# Patient Record
Sex: MALE | Race: WHITE | Employment: FULL TIME | URBAN - METROPOLITAN AREA
[De-identification: names, ages, dates, MRNs, and addresses within clinical notes are randomized per-mention and may not be internally consistent; named-entity substitution may affect disease eponyms.]

---

## 2022-05-18 ENCOUNTER — OFFICE VISIT (OUTPATIENT)
Dept: FAMILY MEDICINE CLINIC | Age: 47
End: 2022-05-18
Payer: COMMERCIAL

## 2022-05-18 ENCOUNTER — HOSPITAL ENCOUNTER (OUTPATIENT)
Age: 47
Setting detail: SPECIMEN
Discharge: HOME | End: 2022-05-18

## 2022-05-18 VITALS
TEMPERATURE: 98.4 F | HEIGHT: 74 IN | BODY MASS INDEX: 39.78 KG/M2 | RESPIRATION RATE: 16 BRPM | DIASTOLIC BLOOD PRESSURE: 88 MMHG | OXYGEN SATURATION: 98 % | SYSTOLIC BLOOD PRESSURE: 136 MMHG | WEIGHT: 310 LBS | HEART RATE: 79 BPM

## 2022-05-18 DIAGNOSIS — Z82.79 FAMILY HISTORY OF PATENT FORAMEN OVALE: ICD-10-CM

## 2022-05-18 DIAGNOSIS — Z82.49 FAMILY HISTORY OF HYPERTROPHIC CARDIOMYOPATHY: ICD-10-CM

## 2022-05-18 DIAGNOSIS — R63.5 WEIGHT GAIN: ICD-10-CM

## 2022-05-18 DIAGNOSIS — Z00.00 PREVENTATIVE HEALTH CARE: Primary | ICD-10-CM

## 2022-05-18 DIAGNOSIS — Z00.00 PREVENTATIVE HEALTH CARE: ICD-10-CM

## 2022-05-18 PROBLEM — S83.241A ACUTE MEDIAL MENISCUS TEAR OF RIGHT KNEE: Status: ACTIVE | Noted: 2020-03-04

## 2022-05-18 LAB
ALBUMIN SERPL-MCNC: 4.2 G/DL (ref 3.5–5.2)
ALBUMIN/GLOBULIN RATIO: 1.8 (ref 1–2.5)
ALP BLD-CCNC: 76 U/L (ref 40–129)
ALT SERPL-CCNC: 17 U/L (ref 5–41)
ANION GAP SERPL CALCULATED.3IONS-SCNC: 11 MMOL/L (ref 9–17)
AST SERPL-CCNC: 18 U/L
BILIRUB SERPL-MCNC: 0.61 MG/DL (ref 0.3–1.2)
BUN BLDV-MCNC: 12 MG/DL (ref 6–20)
CALCIUM SERPL-MCNC: 9.2 MG/DL (ref 8.6–10.4)
CHLORIDE BLD-SCNC: 101 MMOL/L (ref 98–107)
CHOLESTEROL/HDL RATIO: 3.9
CHOLESTEROL: 187 MG/DL
CO2: 25 MMOL/L (ref 20–31)
CREAT SERPL-MCNC: 0.87 MG/DL (ref 0.7–1.2)
GFR AFRICAN AMERICAN: >60 ML/MIN
GFR NON-AFRICAN AMERICAN: >60 ML/MIN
GFR SERPL CREATININE-BSD FRML MDRD: NORMAL ML/MIN/{1.73_M2}
GLUCOSE BLD-MCNC: 86 MG/DL (ref 70–99)
HCT VFR BLD CALC: 42.1 % (ref 40.7–50.3)
HDLC SERPL-MCNC: 48 MG/DL
HEMOGLOBIN: 14.2 G/DL (ref 13–17)
LDL CHOLESTEROL: 124 MG/DL (ref 0–130)
MCH RBC QN AUTO: 32 PG (ref 25.2–33.5)
MCHC RBC AUTO-ENTMCNC: 33.7 G/DL (ref 28.4–34.8)
MCV RBC AUTO: 94.8 FL (ref 82.6–102.9)
NRBC AUTOMATED: 0 PER 100 WBC
PDW BLD-RTO: 12.4 % (ref 11.8–14.4)
PLATELET # BLD: 213 K/UL (ref 138–453)
PMV BLD AUTO: 11.5 FL (ref 8.1–13.5)
POTASSIUM SERPL-SCNC: 4.5 MMOL/L (ref 3.7–5.3)
PROSTATE SPECIFIC ANTIGEN: 0.26 NG/ML
RBC # BLD: 4.44 M/UL (ref 4.21–5.77)
SODIUM BLD-SCNC: 137 MMOL/L (ref 135–144)
TOTAL PROTEIN: 6.6 G/DL (ref 6.4–8.3)
TRIGL SERPL-MCNC: 77 MG/DL
WBC # BLD: 6.1 K/UL (ref 3.5–11.3)

## 2022-05-18 PROCEDURE — 99386 PREV VISIT NEW AGE 40-64: CPT | Performed by: NURSE PRACTITIONER

## 2022-05-18 RX ORDER — FEXOFENADINE HCL 180 MG/1
1 TABLET ORAL 2 TIMES DAILY
COMMUNITY
Start: 2014-01-16

## 2022-05-18 SDOH — ECONOMIC STABILITY: FOOD INSECURITY: WITHIN THE PAST 12 MONTHS, THE FOOD YOU BOUGHT JUST DIDN'T LAST AND YOU DIDN'T HAVE MONEY TO GET MORE.: NEVER TRUE

## 2022-05-18 SDOH — ECONOMIC STABILITY: FOOD INSECURITY: WITHIN THE PAST 12 MONTHS, YOU WORRIED THAT YOUR FOOD WOULD RUN OUT BEFORE YOU GOT MONEY TO BUY MORE.: NEVER TRUE

## 2022-05-18 ASSESSMENT — ENCOUNTER SYMPTOMS
COLOR CHANGE: 0
CONSTIPATION: 0
RHINORRHEA: 0
DIARRHEA: 0
SORE THROAT: 0
ABDOMINAL PAIN: 0
ABDOMINAL DISTENTION: 0
CHEST TIGHTNESS: 0
NAUSEA: 0
BACK PAIN: 0
COUGH: 0
SHORTNESS OF BREATH: 0

## 2022-05-18 ASSESSMENT — PATIENT HEALTH QUESTIONNAIRE - PHQ9
SUM OF ALL RESPONSES TO PHQ QUESTIONS 1-9: 0
SUM OF ALL RESPONSES TO PHQ QUESTIONS 1-9: 0
2. FEELING DOWN, DEPRESSED OR HOPELESS: 0
SUM OF ALL RESPONSES TO PHQ9 QUESTIONS 1 & 2: 0
SUM OF ALL RESPONSES TO PHQ QUESTIONS 1-9: 0
SUM OF ALL RESPONSES TO PHQ QUESTIONS 1-9: 0
1. LITTLE INTEREST OR PLEASURE IN DOING THINGS: 0

## 2022-05-18 ASSESSMENT — SOCIAL DETERMINANTS OF HEALTH (SDOH): HOW HARD IS IT FOR YOU TO PAY FOR THE VERY BASICS LIKE FOOD, HOUSING, MEDICAL CARE, AND HEATING?: NOT HARD AT ALL

## 2022-05-18 NOTE — PATIENT INSTRUCTIONS
Health Maintenance Recommendations  Exercise   · I generally recommend that people of all ages try to get 150 minutes of physical activity per week and it doesnt matter how this totals up, in other words 30 minutes 5 days per week is as good as 50 minutes 3 days a week and so on. · The level of activity should be such that it is able to get your heart rate up to 100 or more, for example a brisk walk should achieve this rate. Dietary Recommendations  · In terms of diet, I generally recommend trying to eat a healthy well balanced diet full of fruits and vegetables. Avoid carbonated drinks and fruit juices and limit your alcohol use. · Avoid processed foods wherever possible (anything that comes in a can or a box) which can be achieved by sticking to the outside walls of the grocery store where generally you will find fresh fruits/vegetables, meats, dairy, and frozen foods. · Try to avoid starches in the diet where possible and minimize bread, rice, potatoes, and pasta in the diet. Specifically try to avoid gluten, which even in people that dont have a yi allergy, causes havoc in the small intestine and alters absorption of nutrients which can in turn lead to obesity. Sleep  · Try to achieve a regular sleep schedule, waking and laying down at the same time each night. Most people need 7 hours per night plus or minus 2 hours. · You will know that youre getting enough because you will wake feeling refreshed and not need to sleep in to catch up on weekends. Skin Care  · Make sure that you dont neglect your skin. · Play it safe in the sun. Use a sunblock on all of your exposed skin. · The sunblock should be broad spectrum and water resistant. · I do recommend an SPF 30 or higher sun screen any time that you plan to be in the sun for more than 20 minutes, even in the winter or on cloudy days (keep in mind that UV light penetrates clouds and can cause burns even on cloudy days).    · Apply 20 to 30 minutes before going out in the sun. Reapply sunblock every 2 hours and after swimming or sweating. Rayma Brandi can also damage your skin on cool, windy days. Clouds and fog do not filter UV light. Make sure you reapply sunblock every 2 hours. · Avoid the sun in the middle of the day, between 10 AM to 4 PM. Your unprotected skin can be damaged in 15 minutes with direct sun exposure. Personal Health  · If you smoke, STOP. There are many resources available to help you successfully quit. · If you are sexually active, always practice safe sex and wear a condom. · See a dentist every 6 months. · See an eye doctor regularly. · Always wear a seat belt while in car. · Get a flu vaccine annually. · Get a tetanus booster vaccine every 10 years. Psychosocial Health  · Finally, make sure that you always have something to look forward to whether this is a vacation, a special event, or just a weekend off work. Having something to look forward to helps to maintain positive focus and is good for mental health.

## 2022-05-18 NOTE — PROGRESS NOTES
Taylor Larson, APRN-[facility]  [address] [address], [address]  [facility] [address]  Dept: [phone]  Dept Fax: [phone]       Patient ID: Nayeli Medina is a 51 y.o. male. HPI    Nayeli Medina is a 51 y.o. male New patient who presents to the office today for a first visit and to establish a relationship with a new primary care provider. Previous PCP: Nisa Giang MD     Today, the patient has no complaints, but was told that he should have gene testing done for cardiac disease, due to mom hx of atril fibrillation, brother cardiomyopathy and HF and dtr was just diagnosis of PFO, dtr cardiologist is the one who highly recommended it. Medial meniscus tear repaired by Dr. Felicitas Mcduffie in 2020    The patient's past medical, surgical, social, and family history as well as his current medications and allergies were reviewed as documented in today's encounter HUSAM Avalos. Current Outpatient Medications on File Prior to Visit   Medication Sig Dispense Refill    Fluticasone Propionate (FLONASE ALLERGY RELIEF NA) by Nasal route       No current facility-administered medications on file prior to visit. Subjective:     Preventative care, male:  Last colonoscopy: 5-6 years ago, [facility] dr. Ricky Posada   Nicotine use: never  Alcohol use: yes, socially  Drug use: never  PSA: 2017   Dental exam: due in June   Eye exam: due     Specialists:  None    Previous office notes, labs, imaging and hospital records were reviewed prior to and during encounter. Review of Systems   Constitutional: Negative for activity change, fatigue and fever. HENT: Negative for congestion, ear pain, rhinorrhea and sore throat. Respiratory: Negative for cough, chest tightness and shortness of breath. Cardiovascular: Negative for chest pain and palpitations. Gastrointestinal: Negative for abdominal distention, abdominal pain, constipation, diarrhea and nausea. Endocrine: Negative for polydipsia, polyphagia and polyuria. Genitourinary: Negative for difficulty urinating and dysuria. Musculoskeletal: Negative for arthralgias, back pain and myalgias. Skin: Negative for color change and rash. Neurological: Negative for dizziness, weakness, light-headedness and headaches. Hematological: Negative for adenopathy. Psychiatric/Behavioral: Negative for agitation and behavioral problems. The patient is not nervous/anxious. Vitals:    05/18/22 0858   BP: 136/88   Pulse: 79   Resp: 16   Temp: 98.4 °F (36.9 °C)   SpO2: 98%     Objective:     Physical Exam  Vitals reviewed. Constitutional:       General: He is not in acute distress. Appearance: Normal appearance. HENT:      Head: Normocephalic and atraumatic. Right Ear: External ear normal.      Left Ear: External ear normal.      Nose: Nose normal.      Mouth/Throat:      Mouth: Mucous membranes are moist.      Pharynx: No oropharyngeal exudate or posterior oropharyngeal erythema. Eyes:      Extraocular Movements: Extraocular movements intact. Conjunctiva/sclera: Conjunctivae normal.      Pupils: Pupils are equal, round, and reactive to light. Cardiovascular:      Rate and Rhythm: Normal rate and regular rhythm. Pulses: Normal pulses. Heart sounds: Normal heart sounds. No murmur heard. Pulmonary:      Effort: Pulmonary effort is normal. No respiratory distress. Breath sounds: Normal breath sounds. No wheezing or rales. Abdominal:      General: Bowel sounds are normal. There is no distension. Palpations: Abdomen is soft. Tenderness: There is no abdominal tenderness. Musculoskeletal:         General: Normal range of motion. Cervical back: Normal range of motion. Right lower leg: No edema. Left lower leg: No edema.    Lymphadenopathy:      Head:      Right side of head: No submental, submandibular, tonsillar, preauricular, posterior auricular or occipital adenopathy. Left side of head: No submental, submandibular, tonsillar, preauricular, posterior auricular or occipital adenopathy. Cervical: No cervical adenopathy. Skin:     General: Skin is warm and dry. Neurological:      General: No focal deficit present. Mental Status: He is alert and oriented to person, place, and time. Deep Tendon Reflexes: Reflexes normal.   Psychiatric:         Mood and Affect: Mood and affect normal.         Behavior: Behavior normal. Behavior is cooperative. Assessment:      Diagnosis Orders   1. Preventative health care  CBC    Comprehensive Metabolic Panel    Hemoglobin A1C    Lipid Panel    PSA Screening   2. Family history of hypertrophic cardiomyopathy  Kristan Marti, [facility] [place], [place]   3. Family history of patent foramen ovale  Kristan Marti, [facility] [place], [place]   4. Weight gain       Plan:     Family history of hypertrophic cardiomyopathy  Family history of patent foramen ovale  - will refer to genetics for further discussion on genetic testing.   - Layne Gaviria, [facility] [place], [place]  - received message that genetics here is unable to consult for this diagnosis  and was given the following information Suggest either  [facility] [phone] or [address] [place] [address] Cardiovascular Genetic Program: [phone]   - pt was called and given the above information by HUSAM Fu. Weight gain  - BP and HR noted on today's visit  - I did d/w pt about starting Adipex and treating for 3 months and will need to be seen monthly for BP and weight check and then will need to take a 6 month hoLiday off the medication.   - I also did stress the importance of exercise to aim for 150 minutes of aerobic activity a week and to watch her diet and calorie intake.   - Discussion had with patient regarding the prescribing of a controlled substance for weight loss (Adipex), the provider is required by law to see the patient  an appointment every thirty days. - This is neccessary to document an accurate weight and blood pressure. - would like pt to try to lose 2 lbs and then can sign contract and start. - Patient verbalized understanding. Preventative health care  - We did discuss in detail the recommended preventative screening guidelines including routine dental visits and eye exam.   - Detailed education was provided on the patient's after visit summary.  - Will order above noted labs and discuss them at follow up visit. - pt verbalized understanding plan of care. Medications, labs, diagnostic studies, consultations and follow-up as documented in this encounter. Rest of systems unchanged, continue current treatments. On this date 5/18/2022 I have spent 30 minutes reviewing previous notes, test results and face to face with the patient discussing the diagnosis and importance of compliance with the treatment plan as well as documenting on the day of the visit.     Eugene Billingsley, AYAKA-CNP

## 2022-05-19 LAB
ESTIMATED AVERAGE GLUCOSE: 97 MG/DL
HBA1C MFR BLD: 5 % (ref 4–6)

## 2023-02-07 ENCOUNTER — OFFICE VISIT (OUTPATIENT)
Dept: FAMILY MEDICINE CLINIC | Age: 48
End: 2023-02-07
Payer: COMMERCIAL

## 2023-02-07 VITALS
HEART RATE: 85 BPM | OXYGEN SATURATION: 98 % | WEIGHT: 315 LBS | BODY MASS INDEX: 40.83 KG/M2 | RESPIRATION RATE: 16 BRPM | SYSTOLIC BLOOD PRESSURE: 136 MMHG | DIASTOLIC BLOOD PRESSURE: 88 MMHG | TEMPERATURE: 97.7 F

## 2023-02-07 DIAGNOSIS — M25.561 ACUTE PAIN OF RIGHT KNEE: Primary | ICD-10-CM

## 2023-02-07 DIAGNOSIS — R63.5 WEIGHT GAIN: ICD-10-CM

## 2023-02-07 PROCEDURE — 99214 OFFICE O/P EST MOD 30 MIN: CPT | Performed by: NURSE PRACTITIONER

## 2023-02-07 SDOH — ECONOMIC STABILITY: INCOME INSECURITY: HOW HARD IS IT FOR YOU TO PAY FOR THE VERY BASICS LIKE FOOD, HOUSING, MEDICAL CARE, AND HEATING?: NOT HARD AT ALL

## 2023-02-07 SDOH — ECONOMIC STABILITY: FOOD INSECURITY: WITHIN THE PAST 12 MONTHS, THE FOOD YOU BOUGHT JUST DIDN'T LAST AND YOU DIDN'T HAVE MONEY TO GET MORE.: NEVER TRUE

## 2023-02-07 SDOH — ECONOMIC STABILITY: HOUSING INSECURITY
IN THE LAST 12 MONTHS, WAS THERE A TIME WHEN YOU DID NOT HAVE A STEADY PLACE TO SLEEP OR SLEPT IN A SHELTER (INCLUDING NOW)?: NO

## 2023-02-07 SDOH — ECONOMIC STABILITY: FOOD INSECURITY: WITHIN THE PAST 12 MONTHS, YOU WORRIED THAT YOUR FOOD WOULD RUN OUT BEFORE YOU GOT MONEY TO BUY MORE.: NEVER TRUE

## 2023-02-07 ASSESSMENT — ENCOUNTER SYMPTOMS
SHORTNESS OF BREATH: 0
COUGH: 0
RHINORRHEA: 0
NAUSEA: 0
CHEST TIGHTNESS: 0
SORE THROAT: 0
BACK PAIN: 0
ABDOMINAL DISTENTION: 0
DIARRHEA: 0
COLOR CHANGE: 0
ABDOMINAL PAIN: 0
CONSTIPATION: 0

## 2023-02-07 ASSESSMENT — PATIENT HEALTH QUESTIONNAIRE - PHQ9
SUM OF ALL RESPONSES TO PHQ QUESTIONS 1-9: 0
2. FEELING DOWN, DEPRESSED OR HOPELESS: 0
1. LITTLE INTEREST OR PLEASURE IN DOING THINGS: 0
SUM OF ALL RESPONSES TO PHQ9 QUESTIONS 1 & 2: 0

## 2023-02-07 NOTE — PROGRESS NOTES
Danise Leyden, APRN-CNP  [facility]  [address] [address]  [address] [address]  Dept: [phone]  Dept Fax: [phone]     Patient ID: Sreekanth Lee is a 49 y.o. male Established patient    HPI    Pt here today for an acute visit secondary to right knee pain, pt states it has gotten worse over the last 2 months. He has always had some pain and had meniscus tear repaired about 4 years ago by [facility] and was told there were degenerative changes. Pt states it hurts when he sits for long periods of time and when he tries to get in and out of the car. - he is also concerned with his weight and would like to know some healthy ways to help with weight loss, he knows his weight is making it harder on his knees. Pt denies any fever or chills. Pt today denies any HA, chest pain, or SOB. Pt denies any N/V/D/C or abdominal pain. Otherwise pt doing well on current tx and no other concerns today. The patient's past medical, surgical, social, and family history as well as his current medications and allergies were reviewed as documented in today's encounter by HUSAM Read. Previous office notes, labs, imaging and hospital records were reviewed prior to and during encounter. Current Outpatient Medications on File Prior to Visit   Medication Sig Dispense Refill    fexofenadine (ALLEGRA) 180 MG tablet Take 1 tablet by mouth 2 times daily      Fluticasone Propionate (FLONASE ALLERGY RELIEF NA) by Nasal route       No current facility-administered medications on file prior to visit. Subjective:     Review of Systems   Constitutional:  Negative for activity change, fatigue and fever. HENT:  Negative for congestion, ear pain, rhinorrhea and sore throat. Respiratory:  Negative for cough, chest tightness and shortness of breath. Cardiovascular:  Negative for chest pain and palpitations.    Gastrointestinal:  Negative for abdominal distention, abdominal pain, constipation, diarrhea and nausea. Endocrine: Negative for polydipsia, polyphagia and polyuria. Genitourinary:  Negative for difficulty urinating and dysuria. Musculoskeletal:  Positive for arthralgias (right knee). Negative for back pain and myalgias. Skin:  Negative for color change and rash. Neurological:  Negative for dizziness, weakness, light-headedness and headaches. Hematological:  Negative for adenopathy. Psychiatric/Behavioral:  Negative for agitation and behavioral problems. The patient is not nervous/anxious. Vitals:    02/07/23 0723   BP: 136/88   Pulse: 85   Resp: 16   Temp: 97.7 °F (36.5 °C)   SpO2: 98%       Objective:     Physical Exam  Vitals reviewed. Constitutional:       Appearance: Normal appearance. HENT:      Head: Normocephalic. Cardiovascular:      Rate and Rhythm: Normal rate and regular rhythm. Musculoskeletal:      Cervical back: Normal range of motion. Right knee: Effusion present. No swelling or erythema. Decreased range of motion. No tenderness. Normal alignment, normal meniscus and normal patellar mobility. Instability Tests: Anterior drawer test negative. Posterior drawer test negative. Anterior Lachman test negative. Medial Avi test negative and lateral Avi test negative. Skin:     General: Skin is warm. Neurological:      Mental Status: He is alert and oriented to person, place, and time. Assessment:      Diagnosis Orders   1. Acute pain of right knee  [facility] [place]      2. Weight gain          Plan:     Acute pain of right knee  - rest, elevate and ice.   - referral for [facility] [place]  - discussed if unable to do PT will refer back to orthopedics. Weight gain  - discussed healthy modifications, increase protein and smaller portion sizes.    - Lifestyle changes: Decrease fats, sugars, carbohydrates, and increase routine exercise, try to get 150 minutes of aerobic activity a week and watch calorie portions and to limit his carbs in his diet. - discussed adipex briefly if he was able to lose at least 2 lbs on his own in 1 month that is an option, pt not interested in medication at this time. - pt verbalized understanding plan of care. Medications, labs, diagnostic studies, consultations and follow-up as documented in this encounter. Rest of systems unchanged, continue current treatments    On this date 2/7/2023 I have spent 30 minutes reviewing previous notes, test results and face to face with the patient discussing the diagnosis and importance of compliance with the treatment plan as well as documenting on the day of the visit.     Crista Harris, AYAKA-CNP

## 2023-02-15 ENCOUNTER — HOSPITAL ENCOUNTER (OUTPATIENT)
Dept: PHYSICAL THERAPY | Facility: CLINIC | Age: 48
Setting detail: THERAPIES SERIES
Discharge: HOME | End: 2023-02-15
Payer: COMMERCIAL

## 2023-02-15 PROCEDURE — 97161 PT EVAL LOW COMPLEX 20 MIN: CPT

## 2023-02-15 PROCEDURE — 97110 THERAPEUTIC EXERCISES: CPT

## 2023-02-15 NOTE — CONSULTS
[facility]  [address] [facility]  Phone: [phone]  Fax: [phone]      Physical Therapy Lower Extremity Evaluation    Date:  2/15/2023  Patient: Rhonda Talavera  : 1975  MRN: [id number]  Physician: AYAKA Tompkins - CNP    Insurance: Krunal Jabier After 25th Visit)  Medical Diagnosis: M25.561 (ICD-10-CM) - Acute pain of right knee    Rehab Codes: M25. 501, M25. 661  Onset date: 23   Next Dr's appt. : --    Subjective:   CC/HPI: Pt reports to PT with R knee pain. Per pt, he reports that he tore his meniscus back in HS, as well as again tearing it around 4 years ago when he did have a repair. Pt states that he was also told that he had arthritis at that time. Currently, pt reports that he can have issues with sitting for longer periods, as well as difficulty with squatting. Pt states that he has some clicking and popping in the knee. Pt denies any numbness/tingling.      PMHx: [x] Refer to full medical chart in [facility]   [] Unremarkable [] Diabetes [] HTN  [] Pacemaker   [] MI/Heart Problems [] Cancer [] Arthritis   [] Other:                  Tests: [] X-Ray:    [] MRI:    [] Other:     Comorbidities:   [] Obesity [] Dialysis  [x] N/A   [] Asthma/COPD [] Dementia [] Other:   [] Stroke [] Sleep apnea [] Other:   [] Vascular disease [] Rheumatic disease [] Other:       Medications:  [x] Refer to full medical record [] None [] Other:  Allergies:       [] Refer to full medical record [x] None [] Other:    ADL/IADL [x] Previously independent with all [x] Currently independent with all Who currently assists the patient with task     [] Previously independent with all except: [] Currently independent with all except:     Bathing  [] Assist [] Assist     Dress/grooming [] Assist [] Assist     Transfer/mobility [] Assist [] Assist     Feeding [] Assist [] Assist     Toileting [] Assist [] Assist     Driving [] Assist [] Assist     Housekeeping [] Assist [] Assist     Grocery shop/meal prep [] Assist [] Assist          Gait Prior level of function Current level of function    [x] Independent  [] Assist [x] Independent  [] Assist   Device: [x] Independent [x] Independent    [] Straight Cane [] Quad cane [] Straight Cane [] Quad cane    [] Standard walker [] Rolling walker   [] 4 wheeled walker [] Standard walker [] Rolling walker   [] 4 wheeled walker    [] Wheelchair [] Wheelchair       Marital Status    Home type 1 SH with basement   Stairs from outside    [employer] inside [address] [place] [address] Rd status Part time   Work Activities/duties  Carrying heavy tools, walking on uneven ground, driving long distance, navigating stairs   Recreational Activities Hunting       Pain present? No   Location --   Pain Rating currently 0/10   Pain at worse 9/10   Pain at best 0/10   Description of pain Intermittent, sharp, aching   Altered Sensation Denies   What makes it worse When my knee is bent for long periods of time   What makes it better Movement   Symptom progression Stayed the same   Sleep Sleep can be affected             Objective:    STRENGTH    Left Right   Hip Flex 4+/5 4+/5   Ext     ABD 3+/5 3+/5   ADD     Knee Flex 5/5 5/5   Ext 5/5 5/5   Ankle DF 5/5 5/5   PF 5/5 5/5   INV     EVER              ROM  ° A/P    Left Right   Hip Flex     Ext     ER     IR     ABD     ADD     Knee Flex 130 116   Ext 0 7* from 0   Ankle DF     PF     INV     EVER            TESTS (+/-) Left Right Not Tested   Ant. Drawer   [x]   Post. Drawer   [x]   Lachmans   [x]   Valgus Stress   [x]   Varus Stress   [x]   Avis   [x]   Apleys Comp.    [x]   Apleys Dist.   [x]   Hip Scouring   [x]   ANAMs   [x]   Piriformis   [x]   Kellees   [x]   Talor Tilt   [x]   Pat-Fem Bailey Fallen   [x]   Thessaly  +        OBSERVATION No Deficit Deficit Not Tested Comments   Posture       Forward Head [] [] []    Rounded Shoulders [] [] []    Genu Valgus [] [] []    Genu Varus [] [] []    Slumped Sitting [] [] []    Palpation [] [x] [] Pt reports tenderness along R distal quad   Sensation [x] [] [] No deficits with testing   Patellar Mobility [] [x] [] Pt with limited R patellar mobility compared to L   Gait [x] [] [] Analysis:         FUNCTION Normal Difficult Unable   Sitting [] [x] []   Standing [] [x] []   Ambulation [x] [] []   Lift/Carry [] [x] []   Stairs [] [x] []   Bending [x] [] []   Squat [] [x] []            Flexibility Normal Left tight Right tight   Hip flexor [] [x] [x]   HS [] [x] [x]   gastroc [] [x] [x]   piriformis [] [x] [x]   Quad [] [x] [x]    [] [] []          Functional Test: LEFS Score: 44% functionally impaired       Assessment:    Patient would benefit from skilled physical therapy services in order to: Improve R LE flexibility, improve R knee ROM, improve manjinder hip/core strength, and decrease pain with sitting and squatting to help improve tolerance to all work activities. Problems:    [x] ? Pain  [x] ? ROM  [x] ? Strength  [x] ? Function        Goals  MET NOT MET ON-  GOING  Details   Date Addressed:       STG: To be met in 8 treatments           1. ? Pain: Decrease pain levels to 5/10 with all sitting to help ease tolerance to driving while at work.  []  []  []      2. ? ROM: Increase flexibility in R LE to help improve R knee ROM to 0/130 to equal R and reduce risk for compensations with all squatting at work. []  []  []      3. Improve score on assessment tool LEFS from 44% impairment to less than 34% impairment, demonstrating improved tolerance to activity to help improve tolerance to all squatting to ease work tasks. []  []  []     4. Independent with Home Exercise Programs []  []  []      []  []  []     Date Addressed:        LTG: To be met in 16 treatments       1.  Improve score on assessment tool LEFS from 44% impairment to less than 24% impairment, demonstrating improved tolerance to activity to help improve tolerance to all squatting to ease work tasks. []  []  []     2. Reduce pain levels to 2/10 or less with all sitting to help ease tolerance to driving while at work.  []  []  []     3. ? Strength: Increase manjinder hip strength to 4+/5 grossly to help improve LE stability with all squatting and walking on uneven ground, reducing risk for further knee injury. []  []  []                Patient goals: \"Decreased pain/stiffness\"    Rehab Potential:  [x] Good  [] Fair  [] Poor   Suggested Professional Referral:  [x] No  [] Yes:  Barriers to Goal Achievement[de-identified]  [x] No  [] Yes:  Domestic Concerns:  [x] No  [] Yes:    Pt. Education:  [x] Plans/Goals, Risks/Benefits discussed  [x] Home exercise program    Method of Education: [x] Verbal  [x] Demo  [x] Written  Access Code: [email or web address]  URL: [employer]/  Date: 02/15/2023  Prepared by: [facility]    Exercises  Standing Hamstring Stretch with Step - 3 x daily - 7 x weekly - 3 sets - 30 seconds hold  Gastroc Stretch on Wall - 3 x daily - 7 x weekly - 3 sets - 30 seconds hold  Prone Quadriceps Stretch with Strap - 3 x daily - 7 x weekly - 3 sets - 30 seconds hold    Comprehension of Education:  [x] Verbalizes understanding. [x] Demonstrates understanding. [x] Needs Review. [] Demonstrates/verbalizes understanding of HEP/Ed previously given.     Treatment Plan:    [x] Therapeutic Exercise   32409  [] Iontophoresis: 4 mg/mL Dexamethasone Sodium Phosphate  mAmin  36964   [x] Manual Therapy  63816 [x] Vasopneumatic cold with compression  27590    [] Gait Training   92024 [] Ultrasound   24873   [] Neuromuscular Re-education  26436 [] Electrical Stimulation Unattended  75861   [x]  Therapeutic Activity  75220 [] Electrical Stimulation Attended  52828   [x] Instruction in HEP  [] Lumbar/Cervical Traction  [id number]   [] Aquatic Therapy   79658 [x] Cold/hotpack    [] Massage   57723      [] Dry Needling, 1 or 2 muscles  15675   [] Biofeedback, first 15 minutes   80094  [] Biofeedback, additional 15 minutes   81886 [] Dry Needling, 3 or more muscles  20561     []  Medication allergies reviewed for use of    Dexamethasone Sodium Phosphate 4mg/ml     with iontophoresis treatments. Pt is not allergic. Frequency:  2 x/week for 16 visits      Todays Treatment:  Precautions: General  Exercise  R Knee Pain Reps/ Time Weight/ Level Comments   Bike            HS step S 3x30\"     SB S      Gastroc wall S 3x30\"     Hip flexr step S      Prone quad S 3x30\"                 Bridges      Clamshells      SL Hip abd                        Other:    Specific Instructions for next treatment: Continue tx per POC    Evaluation Complexity:  History (Personal factors, comorbidities) [] 0 [x] 1-2 [] 3+   Exam (limitations, restrictions) [x] 1-2 [] 3 [] 4+   Clinical presentation (progression) [x] Stable [] Evolving  [] Unstable   Decision Making [x] Low [] Moderate [] High    [x] Low Complexity [] Moderate Complexity [] High Complexity       Treatment Charges: Mins Units   [x] Evaluation       [x]  Low       []  Moderate       []  High 32 1   [x]  Ther Exercise 13 1   []  Manual Therapy     []  Vasocompression     []  Gait     []        TOTAL TREATMENT TIME: 45    Time in: 0800    Time Out: 9007      Electronically signed by: Dania River PT        Physician Signature:________________________________Date:__________________  By signing above or cosigning this note, I have reviewed this plan of care and certify a need for medically necessary rehabilitation services.      *PLEASE SIGN ABOVE AND FAX BACK ALL PAGES*

## 2023-02-16 ENCOUNTER — HOSPITAL ENCOUNTER (OUTPATIENT)
Dept: PHYSICAL THERAPY | Facility: CLINIC | Age: 48
Setting detail: THERAPIES SERIES
Discharge: HOME | End: 2023-02-16
Payer: COMMERCIAL

## 2023-02-16 PROCEDURE — 97110 THERAPEUTIC EXERCISES: CPT

## 2023-02-16 NOTE — FLOWSHEET NOTE
[] [address]  [address]  [phone]  F: [phone] [] [address]  [facility] [address]   [address]  P: [phone]  F: [phone] [x] [place] [facility]  [address]  P: [phone]  F: [phone] [] [address]  P: [phone]  F: [phone] [] [address]  [facility]   [address]   [place]: [phone]  F: [phone]      Physical Therapy Daily Treatment Note    Date:  2023  Patient Name:  Pardeep Lerma    :  1975  MRN: [id number]  Physician: AYAKA Mayo - PRIYANK                                       Insurance: [employer] After 25th Visit)  Medical Diagnosis: M25.561 (ICD-10-CM) - Acute pain of right knee                      Rehab Codes: M25. 154, M25. 661  Onset date: 23                              Next 's appt. : --  Visit# / total visits:     Cancels/No Shows: 0/0    Subjective: pt arrives this afternoon noting that his R knee is a little stiff, did do some exercises this morning. States he did feel better following his previous visit. Pain:  [x] Yes  [] No Location: R knee  Pain Rating: (0-10 scale) 4/10  Pain altered Tx:  [x] No  [] Yes  Action:  Comments:    Objective:       Todays Treatment:  Precautions: General  Exercise  R Knee Pain Reps/ Time Weight/ Level Comments   Bike  7'                 HS step S 3x30\"       SB S 3x30\"       Hip flexr step S 3x30\"       Prone quad S 3x30\"                           Bridges  20x  lime     Clamshells  20x  lime     SL Hip abd  20x  lime                TGym squats/HR  25x  L15    TKE  30x3\"  lime    OKC 3 way hip  15x   lime    LAQ  20x                 Other:     Specific Instructions for next treatment: Continue tx per POC      Treatment Charges: Mins Units   []  Modalities     [x]  Ther Exercise 43 3   []  Manual Therapy     []  Ther Activities     []  Aquatics     []  Vasocompression     []  Other     Total Treatment time 43 3       Assessment: [x] Progressing toward goals. Completed bike warm up without issues. Reviewed and completed previous stretches along with additional stretches to address RLE mobility without issues. Was able to begin strengthening ex to address hip and R knee strength this date. Denied knee pain with all quad focused ex. While completing TGym squat some clicking of the R knee was noted with increased ROM, advised to stop depth of squat prior to that point, good follow through. No pain with all standing ex. Pt just reports some mild soreness. Will monitor response to treatment and progress as chapin. [] No change. [] Other:  [x] Patient would continue to benefit from skilled physical therapy services in order to: Improve R LE flexibility, improve R knee ROM, improve manjinder hip/core strength, and decrease pain with sitting and squatting to help improve tolerance to all work activities. Goals  MET NOT MET ON-  GOING  Details   Date Addressed:           STG: To be met in 8 treatments            1. ? Pain: Decrease pain levels to 5/10 with all sitting to help ease tolerance to driving while at work.  []  []  []      2. ? ROM: Increase flexibility in R LE to help improve R knee ROM to 0/130 to equal R and reduce risk for compensations with all squatting at work. []  []  []      3. Improve score on assessment tool LEFS from 44% impairment to less than 34% impairment, demonstrating improved tolerance to activity to help improve tolerance to all squatting to ease work tasks. []  []  []      4. Independent with Home Exercise Programs []  []  []        []  []  []      Date Addressed:            LTG: To be met in 16 treatments           1.  Improve score on assessment tool LEFS from 44% impairment to less than 24% impairment, demonstrating improved tolerance to activity to help improve tolerance to all squatting to ease work tasks. []  []  []      2. Reduce pain levels to 2/10 or less with all sitting to help ease tolerance to driving while at work.  []  []  []      3. ? Strength: Increase manjinder hip strength to 4+/5 grossly to help improve LE stability with all squatting and walking on uneven ground, reducing risk for further knee injury. []  []  []                        Patient goals: \"Decreased pain/stiffness\"      Pt. Education:  [x] Yes  [] No  [] Reviewed Prior HEP/Ed  Method of Education: [x] Verbal  [x] Demo  [] Written  Comprehension of Education:  [x] Verbalizes understanding. [x] Demonstrates understanding. [x] Needs review. [] Demonstrates/verbalizes HEP/Ed previously given. Access Code: [email or web address]  URL: [email or web address]  Date: 02/15/2023  Prepared by: [facility]     Exercises  Standing Hamstring Stretch with Step - 3 x daily - 7 x weekly - 3 sets - 30 seconds hold  Gastroc Stretch on Wall - 3 x daily - 7 x weekly - 3 sets - 30 seconds hold  Prone Quadriceps Stretch with Strap - 3 x daily - 7 x weekly - 3 sets - 30 seconds hold     Plan: [x] Continue current frequency toward long and short term goals.     [x] Specific Instructions for subsequent treatments: progress as chapin      Time In: 3:00 pm            Time Out: 3:48 pm    Electronically signed by:  Sheila Deleon PTA

## 2023-02-20 ENCOUNTER — HOSPITAL ENCOUNTER (OUTPATIENT)
Dept: PHYSICAL THERAPY | Facility: CLINIC | Age: 48
Setting detail: THERAPIES SERIES
Discharge: HOME | End: 2023-02-20
Payer: COMMERCIAL

## 2023-02-20 PROCEDURE — 97110 THERAPEUTIC EXERCISES: CPT
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
## 2023-02-22 ENCOUNTER — HOSPITAL ENCOUNTER (OUTPATIENT)
Dept: PHYSICAL THERAPY | Facility: CLINIC | Age: 48
Setting detail: THERAPIES SERIES
Discharge: HOME | End: 2023-02-22
Payer: COMMERCIAL

## 2023-02-22 PROCEDURE — 97110 THERAPEUTIC EXERCISES: CPT

## 2023-02-22 NOTE — FLOWSHEET NOTE
[] [address]  [address]  [phone]  F: [phone] [] [address]  [facility] [address]   [address]  P: [phone]  F: [phone] [x] [place] [facility]  [address]  P: [phone]  F: [phone] [] [address]  P: [phone]  F: [phone] [] [address]  [facility]   [address]   [place]: [phone]  F: [phone]      Physical Therapy Daily Treatment Note    Date:  2023  Patient Name:  Valentine Power    :  1975  MRN: [id number]  Physician: AYAKA Obrien - PRIYANK                                       Insurance: [employer] After 25th Visit)  Medical Diagnosis: M25.561 (ICD-10-CM) - Acute pain of right knee                      Rehab Codes: M25. 591, M25. 661  Onset date: 23                              Next 's appt. : --  Visit# / total visits:     Cancels/No Shows: 0/0    Subjective: pt mentions that his knee is feeling much better this morning as compared to his previous visit. Pain:  [x] Yes  [] No Location: R knee  Pain Rating: (0-10 scale) 2-3/10  Pain altered Tx:  [x] No  [] Yes  Action:  Comments:     Objective:       Todays Treatment:  Precautions: General  Exercise  R Knee Pain Reps/ Time Weight/ Level Comments    Bike  8'     x              HS step S 3x30\"     x   SB S 3x30\"     x   Hip flexr step S 3x30\"     x   Prone quad S 3x30\"     x                         Bridges  20x3\" Lime       x   Clamshells  20x Lime    x   SL Hip abd  20x Lime        SLR  20x Lime                TGym squats/HR  30x  L15  x   TKE  20x10\" blue  x   OKC 3 way hip  30x  Lime   x   LAQ  20x3        Step up  20x  8\"  Slow eccentric down, fwd/lat   x   Other:  Hypervolt - R HS/gastroc/ quad -6'     Specific Instructions for next treatment: Continue tx per POC      Treatment Charges: Mins Units   []  Modalities     [x]  Ther Exercise 42 3   [x]  Manual Therapy 6 nc   []  Ther Activities     []  Aquatics     []  Vasocompression     []  Other     Total Treatment time 48 3       Assessment: [x] Progressing toward goals. Warm up and stretches completed as previous. Reviewed mat resisted ex to ensure good technique, able to complete without issues. Added resisted SLR for continued strengthening. Increased resistance with TKE, also added step ups forward and lateral. Cueing with step ups to focus on slow and controlled eccentric movements. Able to finish all strengthening ex without reports of R knee pain. End treatment with use of hypervolt to address any R LE tension into front and back mm. Will continue to progress as chapin. [] No change. [] Other:  [x] Patient would continue to benefit from skilled physical therapy services in order to: Improve R LE flexibility, improve R knee ROM, improve manjinder hip/core strength, and decrease pain with sitting and squatting to help improve tolerance to all work activities. Goals  MET NOT MET ON-  GOING  Details   Date Addressed:           STG: To be met in 8 treatments            1. ? Pain: Decrease pain levels to 5/10 with all sitting to help ease tolerance to driving while at work.  []  []  []      2. ? ROM: Increase flexibility in R LE to help improve R knee ROM to 0/130 to equal R and reduce risk for compensations with all squatting at work. []  []  []      3. Improve score on assessment tool LEFS from 44% impairment to less than 34% impairment, demonstrating improved tolerance to activity to help improve tolerance to all squatting to ease work tasks. []  []  []      4. Independent with Home Exercise Programs []  []  []        []  []  []      Date Addressed:            LTG: To be met in 16 treatments           1.  Improve score on assessment tool LEFS from 44% impairment to less than 24% impairment, demonstrating improved tolerance to activity to help improve tolerance to all squatting to ease work tasks. []  []  []      2. Reduce pain levels to 2/10 or less with all sitting to help ease tolerance to driving while at work.  []  []  []      3. ? Strength: Increase manjinder hip strength to 4+/5 grossly to help improve LE stability with all squatting and walking on uneven ground, reducing risk for further knee injury. []  []  []                        Patient goals: \"Decreased pain/stiffness\"      Pt. Education:  [x] Yes  [] No  [x] Reviewed Prior HEP/Ed  Method of Education: [x] Verbal  [] Demo  [] Written  Comprehension of Education:  [x] Verbalizes understanding. [] Demonstrates understanding. [] Needs review. [] Demonstrates/verbalizes HEP/Ed previously given. Access Code: [email or web address]  URL: [email or web address]  Date: 02/15/2023  Prepared by: [facility]     Exercises  Standing Hamstring Stretch with Step - 3 x daily - 7 x weekly - 3 sets - 30 seconds hold  Gastroc Stretch on Wall - 3 x daily - 7 x weekly - 3 sets - 30 seconds hold  Prone Quadriceps Stretch with Strap - 3 x daily - 7 x weekly - 3 sets - 30 seconds hold     Plan: [x] Continue current frequency toward long and short term goals.     [x] Specific Instructions for subsequent treatments: progress as chapin      Time In: 8:00 am            Time Out:  8:55 am    Electronically signed by:  Danial Carvajal PTA

## 2023-11-03 ENCOUNTER — TELEMEDICINE (OUTPATIENT)
Dept: FAMILY MEDICINE CLINIC | Age: 48
End: 2023-11-03
Payer: COMMERCIAL

## 2023-11-03 ENCOUNTER — TELEPHONE (OUTPATIENT)
Dept: FAMILY MEDICINE CLINIC | Age: 48
End: 2023-11-03

## 2023-11-03 DIAGNOSIS — H10.32 ACUTE BACTERIAL CONJUNCTIVITIS OF LEFT EYE: Primary | ICD-10-CM

## 2023-11-03 PROCEDURE — 99213 OFFICE O/P EST LOW 20 MIN: CPT | Performed by: NURSE PRACTITIONER

## 2023-11-03 RX ORDER — TOBRAMYCIN AND DEXAMETHASONE 3; 1 MG/ML; MG/ML
1 SUSPENSION/ DROPS OPHTHALMIC
Qty: 2.5 ML | Refills: 0 | Status: SHIPPED | OUTPATIENT
Start: 2023-11-03 | End: 2023-11-13

## 2023-11-03 ASSESSMENT — PATIENT HEALTH QUESTIONNAIRE - PHQ9
SUM OF ALL RESPONSES TO PHQ QUESTIONS 1-9: 0
2. FEELING DOWN, DEPRESSED OR HOPELESS: 0
SUM OF ALL RESPONSES TO PHQ9 QUESTIONS 1 & 2: 0
1. LITTLE INTEREST OR PLEASURE IN DOING THINGS: 0
SUM OF ALL RESPONSES TO PHQ QUESTIONS 1-9: 0

## 2023-11-03 ASSESSMENT — ENCOUNTER SYMPTOMS
EYE PAIN: 1
EYE REDNESS: 1
EYE DISCHARGE: 1

## 2023-11-03 NOTE — TELEPHONE ENCOUNTER
Patient has had watery eyes, left eye is red and swollen and now with yellow/green drainage. Scheduled for a VV now.

## 2023-11-03 NOTE — PROGRESS NOTES
Lluvia Palacios, APRN-[facility]  [address] [place] [address]  [address], [address] [address]  Dept: [phone]  Dept Fax: [phone]    Patient ID: Brian Ghosh is a 51 y.o. male. HPI     Subjective:     Brian Ghosh is a 51 y.o. male who presents today via virtual visit complaining of fection to left eye. Pt states it was pink last night and some swelling, but then he woke up with this morning with the left eye closed shut with yellow discharge, swelling and redness. Pt denies any fever or chills. Pt today denies any HA, chest pain, or SOB. Pt denies any N/V/D/C or abdominal pain. Otherwise pt doing well on current tx and no other concerns today. The patient's past medical, surgical, social, and family history as well as his current medications and allergies were reviewed as documented in today's encounter by HUSAM Hidalgo. Current Outpatient Medications on File Prior to Visit   Medication Sig Dispense Refill    fexofenadine (ALLEGRA) 180 MG tablet Take 1 tablet by mouth 2 times daily      Fluticasone Propionate (FLONASE ALLERGY RELIEF NA) by Nasal route       No current facility-administered medications on file prior to visit. Review of Systems   Eyes:  Positive for pain, discharge and redness. Objective:     No Known Allergies, History reviewed. No pertinent past medical history. ,   Past Surgical History:   Procedure Laterality Date    TONSILLECTOMY     ,   Social History     Tobacco Use    Smoking status: Never    Smokeless tobacco: Never   Substance Use Topics    Alcohol use: Yes     Comment: socially    Drug use: Never   ,   Family History   Problem Relation Age of Onset    Atrial Fibrillation Mother     Heart Failure Brother 27        hypertrophic valve   ,   Immunization History   Administered Date(s) Administered    COVID-19, MODERNA BLUE border, Primary or Immunocompromised, (age 12y+), IM, 100 mcg/0.5mL

## 2024-03-04 ENCOUNTER — TELEPHONE (OUTPATIENT)
Dept: FAMILY MEDICINE CLINIC | Age: 49
End: 2024-03-04

## 2024-03-04 DIAGNOSIS — M25.561 ACUTE PAIN OF RIGHT KNEE: Primary | ICD-10-CM

## 2024-03-04 NOTE — TELEPHONE ENCOUNTER
Patient was told at his visit with Dr. Stern today that he will need to find another provider because they won't be with [facility] after the first. I did explain that they will probably come to an agreement before then, but to check with his insurance about who he can see and let us know.

## 2024-03-04 NOTE — TELEPHONE ENCOUNTER
----- Message from Otf Regulo sent at 3/4/2024  1:54 PM EST -----  Subject: Referral Request    Reason for referral request? Patient needs referral to different ortho   doctor than who he has seen before because after this month they are no   longer with [employer]  Provider patient wants to be referred to(if known):     Provider Phone Number(if known):    Additional Information for Provider?   ---------------------------------------------------------------------------  --------------  CALL BACK INFO    [phone]; OK to leave message on voicemail  ---------------------------------------------------------------------------  --------------

## 2024-03-04 NOTE — TELEPHONE ENCOUNTER
Patient called back to office stating that he checked with his insurance to see what orthopedic providers were in his network.  Patient would like referral for Dr. Holger Cabral.

## 2024-03-07 ENCOUNTER — OFFICE VISIT (OUTPATIENT)
Dept: ORTHOPEDIC SURGERY | Age: 49
End: 2024-03-07
Payer: COMMERCIAL

## 2024-03-07 DIAGNOSIS — M25.561 RIGHT KNEE PAIN, UNSPECIFIED CHRONICITY: ICD-10-CM

## 2024-03-07 DIAGNOSIS — M25.562 LEFT KNEE PAIN, UNSPECIFIED CHRONICITY: Primary | ICD-10-CM

## 2024-03-07 PROCEDURE — 99203 OFFICE O/P NEW LOW 30 MIN: CPT | Performed by: ORTHOPAEDIC SURGERY

## 2024-03-07 SDOH — HEALTH STABILITY: PHYSICAL HEALTH: ON AVERAGE, HOW MANY MINUTES DO YOU ENGAGE IN EXERCISE AT THIS LEVEL?: 20 MIN

## 2024-03-07 SDOH — HEALTH STABILITY: PHYSICAL HEALTH: ON AVERAGE, HOW MANY DAYS PER WEEK DO YOU ENGAGE IN MODERATE TO STRENUOUS EXERCISE (LIKE A BRISK WALK)?: 1 DAY

## 2024-03-07 NOTE — PROGRESS NOTES
[facility]                   Holger Cabral M.D.            [address], [address]               [place], [place] [address]           Dept Phone: [phone]           Dept Fax:  [phone] [address] [facility]                       [address]           [place], [place] [address]          Dept Phone: [phone]           Dept Fax:  [phone]      Chief Compliant:  Chief Complaint   Patient presents with    Pain     Rt knee        History of Present Illness:  This is a 48 y.o. male who presents to the clinic today for evaluation / follow up of significant left knee pain.  Patient is 48-year-old gentleman whose had knee pain on and off for the last few months.  He had a previous arthroscopy examination of his right knee with a medial meniscus tear done elsewhere.  He is at the point where he has utilize crutches with the pains got to be something with any twisting or turning he had an episode recently in the shower when he pivoted and felt something pop.  Is been quite painful for him..       Review of Systems   Constitutional: Negative for fever, chills, sweats.   Eyes: Negative for changes in vision, or pain.   HENT: Negative for ear ache, epistaxis, or sore throat.  Respiratory/Cardio: Negative for Chest pain, palpitations, SOB, or cough.  Gastrointestinal: Negative for abdominal pain, N/V/D.   Genitourinary: Negative for dysuria, frequency, urgency, or hematuria.   Neurological: Negative for headache, numbness, or weakness.   Integumentary: Negative for rash, itching, laceration, or abrasion.   Musculoskeletal: Positive for Pain (Rt knee)       Physical Exam:  Constitutional: Patient is oriented to person, place, and time. Patient appears well-developed and well nourished.   HENT: Negative otherwise noted  Head: Normocephalic and Atraumatic  Nose: Normal  Eyes: Conjunctivae and EOM are normal  Neck: Normal range of motion Neck supple.    Respiratory/Cardio:

## 2024-03-14 ENCOUNTER — HOSPITAL ENCOUNTER (OUTPATIENT)
Dept: MRI IMAGING | Facility: CLINIC | Age: 49
Discharge: HOME | End: 2024-03-16
Payer: COMMERCIAL

## 2024-03-14 DIAGNOSIS — M25.562 LEFT KNEE PAIN, UNSPECIFIED CHRONICITY: ICD-10-CM

## 2024-03-14 PROCEDURE — 73721 MRI JNT OF LWR EXTRE W/O DYE: CPT

## 2024-03-15 ENCOUNTER — TELEPHONE (OUTPATIENT)
Dept: ORTHOPEDIC SURGERY | Age: 49
End: 2024-03-15

## 2024-03-15 NOTE — TELEPHONE ENCOUNTER
Holger Cabral MD  P [facility] Clinical Support Pool  Medial meniscus tear of the knee.  Consider arthroscopic intervention if symptoms persist    Called and left VM for patient to call back and discuss MRI results.

## 2024-03-27 ASSESSMENT — ENCOUNTER SYMPTOMS
ABDOMINAL PAIN: 0
SHORTNESS OF BREATH: 0
SINUS PRESSURE: 0
NAUSEA: 0

## 2024-03-27 NOTE — H&P (VIEW-ONLY)
HISTORY and PHYSICAL  [facility]       NAME:  Mikhail Mahan  MRN: [id number]   YOB: 1975   Date: 3/29/2024   Age: 48 y.o.  Gender: male     COMPLAINT AND PRESENT HISTORY:     Mikhail Mahan is 48 y.o., male, presents for pre-anesthesia/admission testing for   Dx: Dislocation of left knee with medial meniscus tear    With scheduled KNEE ARTHROSCOPY PARTIAL MEDIAL MENISCECTOMY LEFT   per Dr. Cabral    Office note per Dr. Cabral  on  3/07/24, italicized below.  History of Present Illness:  This is a 48 y.o. male who presents to the clinic today for evaluation / follow up of significant left knee pain.  Patient is 48-year-old gentleman whose had knee pain on and off for the last few months.  He had a previous arthroscopy examination of his right knee with a medial meniscus tear done elsewhere.  He is at the point where he has utilize crutches with the pains got to be something with any twisting or turning he had an episode recently in the shower when he pivoted and felt something pop.  Is been quite painful for him..   Above reviewed with patient and he concurs    UPDATE:   Patient states that he initially had stiffness for a short time, then having the twisting episode in the shower and popping sounds had severe sharp pain.    Patient denies any prior injury or knee surgery.   Patient is s/p right knee arthroscopy done in 2020.    Patient  continues to c/o pain but states is  considerably less , admits to continued stiffness, clicking and popping in the  left Knee. Patient reports that he has no longer needed to use his crutches.    Pt describes and rates  the pain as  dull ache 1-3/10in intensity on average.    Pt states that more activity increases the pain level more.    Onset of symptoms started 3 months ago,  that has progressively gotten better but, still has  some limitations.     Patient reports that  prolonged standing or sitting aggravates sxs the most.     Patient has been

## 2024-03-27 NOTE — H&P
using  icing , elevation, stretching to help in pain relief.      Pt denies any  recent physical therapy.   No history of corticosteroid injections.     Pt reports that  the knee does  experience a buckling or given away sensation.  Pt denies any  locking up of the knee. Reports that his knee sometimes wants to hyperextend backward at times.   Pt denies any recent falls or trauma.  Patient  has used crutches    as assistive device.      Patient denies any extreme  joint warmth, redness in association to infection. Denies any  fever or chills.      NO SIGNIFICANT MEDICAL HISTORY.   Denies any chest pain/pressure. No leg swelling. Pt denies any cough.  , dyspnea or SOBOE.  No recent URI. No fever or chills.     BP Readings from Last 3 Encounters:   03/29/24 (!) 154/96   02/07/23 136/88   05/18/22 136/88     Pt has an elevated BP and states that he has nervousness when in medical service.     anticoagulants or blood thinners:  none    Pt denies any hx of blood clots    Functional Capacity per patient:              1. Patient is able to walk 2 city blocks on level ground without SOB.              2. Patient is able to climb 2 flights of stairs without SOB.    Patient denies any personal or family problems with anesthesia.      RECENT IMAGING R/T HPI     Result Date: 3/7/2024  X-ray taken today reviewed by me show standing AP both knees and a lateral left knee.  Patient's left knee is relatively unremarkable with well-maintained medial and lateral joint spaces as well as patellofemoral joint no obvious significant effusion is noted as well patient's right knee is noted to have some moderate medial joint space narrowing and subchondral sclerosis.  No other acute or chronic process is noted     MRI OF THE LEFT KNEE WITHOUT CONTRAST, 3/14/2024   IMPRESSION:  Partial degenerative tear through the posterior horn of the medial meniscus.  The meniscal body is extruded along the medial joint line.     Joint effusion,  for shortness of breath.    Cardiovascular:  Negative for leg swelling.   Gastrointestinal:  Negative for abdominal pain and nausea.   Skin: Negative.    Neurological:  Negative for dizziness.   Psychiatric/Behavioral:  Negative for sleep disturbance.    All other systems reviewed and are negative.      GENERAL PHYSICAL EXAM     Vitals: BP (!) 154/96   Pulse 78   Temp 98.8 °F (37.1 °C) (Temporal)   Ht 1.854 m (6' 1\")   Wt (!) 142.9 kg (315 lb)   SpO2 97%   BMI 41.56 kg/m²               Physical Exam  Constitutional:       General: He is not in acute distress.  HENT:      Head: Normocephalic.      Right Ear: External ear normal.      Left Ear: External ear normal.      Nose: Nose normal.      Mouth/Throat:      Pharynx: No posterior oropharyngeal erythema.   Eyes:      General:         Right eye: No discharge.         Left eye: No discharge.   Cardiovascular:      Rate and Rhythm: Normal rate and regular rhythm.      Heart sounds: Normal heart sounds.   Pulmonary:      Breath sounds: Normal breath sounds.   Abdominal:      General: Bowel sounds are normal.      Tenderness: There is no abdominal tenderness. There is no guarding.      Comments: obese   Musculoskeletal:      Comments: Tenderness on palpation of the left Knee joint space worse on the medial aspect.  Mild antalgic gait   Neurological:      Mental Status: He is alert and oriented to person, place, and time.   Psychiatric:         Mood and Affect: Mood normal.         LAB REVIEW     Lab Results   Component Value Date    WBC 6.0 03/29/2024    RBC 4.52 03/29/2024    HGB 14.4 03/29/2024    HCT 43.0 03/29/2024    MCV 95.2 03/29/2024    MCH 31.9 03/29/2024    MCHC 33.5 03/29/2024    RDW 12.9 03/29/2024     03/29/2024    MPV 8.1 03/29/2024        Lab Results   Component Value Date     03/29/2024    K 4.3 03/29/2024     03/29/2024    CO2 28 03/29/2024    BUN 11 03/29/2024    CREATININE 0.9 03/29/2024    GLUCOSE 106 (H) 03/29/2024

## 2024-03-29 ENCOUNTER — HOSPITAL ENCOUNTER (OUTPATIENT)
Dept: PREADMISSION TESTING | Age: 49
End: 2024-03-29
Payer: COMMERCIAL

## 2024-03-29 VITALS
HEIGHT: 73 IN | HEART RATE: 78 BPM | BODY MASS INDEX: 41.75 KG/M2 | TEMPERATURE: 98.8 F | DIASTOLIC BLOOD PRESSURE: 96 MMHG | SYSTOLIC BLOOD PRESSURE: 154 MMHG | WEIGHT: 315 LBS | OXYGEN SATURATION: 97 %

## 2024-03-29 LAB
ANION GAP SERPL CALCULATED.3IONS-SCNC: 7 MMOL/L (ref 9–17)
BASOPHILS # BLD: 0 K/UL (ref 0–0.2)
BASOPHILS NFR BLD: 1 % (ref 0–2)
BUN SERPL-MCNC: 11 MG/DL (ref 6–20)
CALCIUM SERPL-MCNC: 9.5 MG/DL (ref 8.6–10.4)
CHLORIDE SERPL-SCNC: 105 MMOL/L (ref 98–107)
CO2 SERPL-SCNC: 28 MMOL/L (ref 20–31)
CREAT SERPL-MCNC: 0.9 MG/DL (ref 0.7–1.2)
EOSINOPHIL # BLD: 0.1 K/UL (ref 0–0.4)
EOSINOPHILS RELATIVE PERCENT: 1 % (ref 0–4)
ERYTHROCYTE [DISTWIDTH] IN BLOOD BY AUTOMATED COUNT: 12.9 % (ref 11.5–14.9)
GFR SERPL CREATININE-BSD FRML MDRD: >90 ML/MIN/1.73M2
GLUCOSE SERPL-MCNC: 106 MG/DL (ref 70–99)
HCT VFR BLD AUTO: 43 % (ref 41–53)
HGB BLD-MCNC: 14.4 G/DL (ref 13.5–17.5)
LYMPHOCYTES NFR BLD: 1 K/UL (ref 1–4.8)
LYMPHOCYTES RELATIVE PERCENT: 17 % (ref 24–44)
MCH RBC QN AUTO: 31.9 PG (ref 26–34)
MCHC RBC AUTO-ENTMCNC: 33.5 G/DL (ref 31–37)
MCV RBC AUTO: 95.2 FL (ref 80–100)
MONOCYTES NFR BLD: 0.6 K/UL (ref 0.1–1.3)
MONOCYTES NFR BLD: 11 % (ref 1–7)
NEUTROPHILS NFR BLD: 70 % (ref 36–66)
NEUTS SEG NFR BLD: 4.3 K/UL (ref 1.3–9.1)
PLATELET # BLD AUTO: 222 K/UL (ref 150–450)
PMV BLD AUTO: 8.1 FL (ref 6–12)
POTASSIUM SERPL-SCNC: 4.3 MMOL/L (ref 3.7–5.3)
RBC # BLD AUTO: 4.52 M/UL (ref 4.5–5.9)
SODIUM SERPL-SCNC: 140 MMOL/L (ref 135–144)
WBC OTHER # BLD: 6 K/UL (ref 3.5–11)

## 2024-03-29 PROCEDURE — 80048 BASIC METABOLIC PNL TOTAL CA: CPT

## 2024-03-29 PROCEDURE — 36415 COLL VENOUS BLD VENIPUNCTURE: CPT

## 2024-03-29 PROCEDURE — 85025 COMPLETE CBC W/AUTO DIFF WBC: CPT

## 2024-03-29 PROCEDURE — APPSS45 APP SPLIT SHARED TIME 31-45 MINUTES: Performed by: NURSE PRACTITIONER

## 2024-03-29 NOTE — DISCHARGE INSTRUCTIONS
Pre-op Instructions For Out-Patient Surgery    Medication Instructions:  Please stop herbs and any supplements now (includes vitamins and minerals).    Please contact your surgeon and prescribing physician for pre-op instructions for any blood thinners.     If you have inhalers/aerosol treatments at home, please use them the morning of your surgery and bring the inhalers with you to the hospital.    Please take the following medications the morning of your surgery with a sip of water:    None    Surgery Instructions:  After midnight before surgery:  Do not eat or drink anything, including water, mints, gum, and hard candy.  You may brush your teeth without swallowing.  No smoking, chewing tobacco, or street drugs.    Please shower or bathe before surgery.  If you were given Surgical Scrub Chlorhexidine Gluconate Liquid (CHG), please shower the night before and the morning of your surgery following the detailed instructions you received during your pre-admission visit.     Please do not wear any cologne, lotion, powder, deodorant, jewelry, piercings, perfume, makeup, nail polish, hair accessories, or hair spray on the day of surgery.  Wear loose comfortable clothing.    Leave your valuables at home but bring a payment source for any after-surgery prescriptions you plan to fill at [facility].  Bring a storage case for any glasses/contacts.    An adult who is responsible for you MUST drive you home and should be with you for the first 24 hours after surgery.     If having out-patient knee and foot surgeries, please arrange for planned crutches, walker, or wheelchair before arriving to the hospital.    The Day of Surgery:  Arrive at [facility] Surgery Entrance at the time directed by your surgeon and check in at the desk.     If you have a living will or healthcare power of , please bring a copy.    You will be taken to the pre-op holding area where you will be  prepared for surgery.  A physical assessment will be performed by a nurse practitioner or house officer.  Your IV will be started and you will meet your anesthesiologist.    When you go to surgery, your family will be directed to the surgical waiting room, where the doctor should speak with them after your surgery.    After surgery, you will be taken to the recovery area.  When you are alert and stable, you will receive instructions and be prepared for discharge.     If you use a Bi-PAP or C-PAP machine, please bring it with you and leave it in the car in case it is needed in recovery room.

## 2024-04-05 ENCOUNTER — ANESTHESIA EVENT (OUTPATIENT)
Dept: OPERATING ROOM | Age: 49
End: 2024-04-05
Payer: COMMERCIAL

## 2024-04-05 NOTE — PRE-PROCEDURE INSTRUCTIONS
No answer, left message ?        YES                     Unable to leave message ?    When were you told to arrive at hospital ?      Do you have a  ?    Are you on any blood thinners ?                     If yes when did you stop taking ?    Do you have your prep Rx filled and instruction ?      Nothing to eat the day before , only clear liquids.    Are you experiencing any covid symptoms ?     Do you have any infections or rash we should be aware of ?      Do you have the Hibiclens soap to use the night before and the morning of surgery ?    Nothing to eat or drink after midnight, only a sip of water to take any medication instructed to take the night before.  Wear comfortable clothing, leave any valuables at home, remove any jewelry and body piercing .   MESSAGE LEFT WITH DETAILS.

## 2024-04-08 ENCOUNTER — ANESTHESIA (OUTPATIENT)
Dept: OPERATING ROOM | Age: 49
End: 2024-04-08
Payer: COMMERCIAL

## 2024-04-08 ENCOUNTER — HOSPITAL ENCOUNTER (OUTPATIENT)
Age: 49
Setting detail: OUTPATIENT SURGERY
Discharge: HOME | End: 2024-04-08
Attending: ORTHOPAEDIC SURGERY | Admitting: ORTHOPAEDIC SURGERY
Payer: COMMERCIAL

## 2024-04-08 VITALS
HEART RATE: 67 BPM | WEIGHT: 310 LBS | DIASTOLIC BLOOD PRESSURE: 92 MMHG | BODY MASS INDEX: 41.08 KG/M2 | RESPIRATION RATE: 14 BRPM | HEIGHT: 73 IN | SYSTOLIC BLOOD PRESSURE: 143 MMHG | TEMPERATURE: 96.8 F | OXYGEN SATURATION: 97 %

## 2024-04-08 DIAGNOSIS — S83.241A ACUTE MEDIAL MENISCUS TEAR OF RIGHT KNEE, INITIAL ENCOUNTER: Primary | ICD-10-CM

## 2024-04-08 PROCEDURE — 29881 ARTHRS KNE SRG MNISECTMY M/L: CPT | Performed by: ORTHOPAEDIC SURGERY

## 2024-04-08 PROCEDURE — 2580000003 HC RX 258: Performed by: ANESTHESIOLOGY

## 2024-04-08 PROCEDURE — 6360000002 HC RX W HCPCS: Performed by: ORTHOPAEDIC SURGERY

## 2024-04-08 PROCEDURE — 2500000003 HC RX 250 WO HCPCS: Performed by: ANESTHESIOLOGY

## 2024-04-08 PROCEDURE — 6370000000 HC RX 637 (ALT 250 FOR IP): Performed by: ANESTHESIOLOGY

## 2024-04-08 PROCEDURE — 7100000001 HC PACU RECOVERY - ADDTL 15 MIN: Performed by: ORTHOPAEDIC SURGERY

## 2024-04-08 PROCEDURE — 7100000030 HC ASPR PHASE II RECOVERY - FIRST 15 MIN: Performed by: ORTHOPAEDIC SURGERY

## 2024-04-08 PROCEDURE — 3600000003 HC SURGERY LEVEL 3 BASE: Performed by: ORTHOPAEDIC SURGERY

## 2024-04-08 PROCEDURE — 2500000003 HC RX 250 WO HCPCS: Performed by: NURSE ANESTHETIST, CERTIFIED REGISTERED

## 2024-04-08 PROCEDURE — 7100000000 HC PACU RECOVERY - FIRST 15 MIN: Performed by: ORTHOPAEDIC SURGERY

## 2024-04-08 PROCEDURE — 7100000031 HC ASPR PHASE II RECOVERY - ADDTL 15 MIN: Performed by: ORTHOPAEDIC SURGERY

## 2024-04-08 PROCEDURE — 6360000002 HC RX W HCPCS: Performed by: NURSE ANESTHETIST, CERTIFIED REGISTERED

## 2024-04-08 PROCEDURE — 3700000000 HC ANESTHESIA ATTENDED CARE: Performed by: ORTHOPAEDIC SURGERY

## 2024-04-08 PROCEDURE — 7100000010 HC PHASE II RECOVERY - FIRST 15 MIN: Performed by: ORTHOPAEDIC SURGERY

## 2024-04-08 PROCEDURE — 2709999900 HC NON-CHARGEABLE SUPPLY: Performed by: ORTHOPAEDIC SURGERY

## 2024-04-08 PROCEDURE — 7100000011 HC PHASE II RECOVERY - ADDTL 15 MIN: Performed by: ORTHOPAEDIC SURGERY

## 2024-04-08 PROCEDURE — 3700000001 HC ADD 15 MINUTES (ANESTHESIA): Performed by: ORTHOPAEDIC SURGERY

## 2024-04-08 PROCEDURE — 3600000013 HC SURGERY LEVEL 3 ADDTL 15MIN: Performed by: ORTHOPAEDIC SURGERY

## 2024-04-08 RX ORDER — LIDOCAINE HYDROCHLORIDE 10 MG/ML
1 INJECTION, SOLUTION EPIDURAL; INFILTRATION; INTRACAUDAL; PERINEURAL
Status: COMPLETED | OUTPATIENT
Start: 2024-04-08 | End: 2024-04-08

## 2024-04-08 RX ORDER — PROPOFOL 10 MG/ML
INJECTION, EMULSION INTRAVENOUS PRN
Status: DISCONTINUED | OUTPATIENT
Start: 2024-04-08 | End: 2024-04-08 | Stop reason: SDUPTHER

## 2024-04-08 RX ORDER — ACETAMINOPHEN 500 MG
1000 TABLET ORAL ONCE
Status: COMPLETED | OUTPATIENT
Start: 2024-04-08 | End: 2024-04-08

## 2024-04-08 RX ORDER — GABAPENTIN 300 MG/1
300 CAPSULE ORAL ONCE
Status: COMPLETED | OUTPATIENT
Start: 2024-04-08 | End: 2024-04-08

## 2024-04-08 RX ORDER — ROPIVACAINE HYDROCHLORIDE 5 MG/ML
INJECTION, SOLUTION EPIDURAL; INFILTRATION; PERINEURAL PRN
Status: DISCONTINUED | OUTPATIENT
Start: 2024-04-08 | End: 2024-04-08 | Stop reason: ALTCHOICE

## 2024-04-08 RX ORDER — SODIUM CHLORIDE 0.9 % (FLUSH) 0.9 %
5-40 SYRINGE (ML) INJECTION PRN
Status: DISCONTINUED | OUTPATIENT
Start: 2024-04-08 | End: 2024-04-08 | Stop reason: HOSPADM

## 2024-04-08 RX ORDER — MIDAZOLAM HYDROCHLORIDE 1 MG/ML
INJECTION INTRAMUSCULAR; INTRAVENOUS PRN
Status: DISCONTINUED | OUTPATIENT
Start: 2024-04-08 | End: 2024-04-08 | Stop reason: SDUPTHER

## 2024-04-08 RX ORDER — HYDROCODONE BITARTRATE AND ACETAMINOPHEN 5; 325 MG/1; MG/1
1 TABLET ORAL EVERY 6 HOURS PRN
Qty: 20 TABLET | Refills: 0 | Status: SHIPPED | OUTPATIENT
Start: 2024-04-08 | End: 2024-04-13

## 2024-04-08 RX ORDER — KETOROLAC TROMETHAMINE 30 MG/ML
INJECTION, SOLUTION INTRAMUSCULAR; INTRAVENOUS PRN
Status: DISCONTINUED | OUTPATIENT
Start: 2024-04-08 | End: 2024-04-08 | Stop reason: SDUPTHER

## 2024-04-08 RX ORDER — SODIUM CHLORIDE 0.9 % (FLUSH) 0.9 %
5-40 SYRINGE (ML) INJECTION EVERY 12 HOURS SCHEDULED
Status: DISCONTINUED | OUTPATIENT
Start: 2024-04-08 | End: 2024-04-08 | Stop reason: HOSPADM

## 2024-04-08 RX ORDER — SODIUM CHLORIDE 9 MG/ML
INJECTION, SOLUTION INTRAVENOUS PRN
Status: DISCONTINUED | OUTPATIENT
Start: 2024-04-08 | End: 2024-04-08 | Stop reason: HOSPADM

## 2024-04-08 RX ORDER — SODIUM CHLORIDE, SODIUM LACTATE, POTASSIUM CHLORIDE, CALCIUM CHLORIDE 600; 310; 30; 20 MG/100ML; MG/100ML; MG/100ML; MG/100ML
INJECTION, SOLUTION INTRAVENOUS CONTINUOUS
Status: DISCONTINUED | OUTPATIENT
Start: 2024-04-08 | End: 2024-04-08 | Stop reason: HOSPADM

## 2024-04-08 RX ORDER — LIDOCAINE HYDROCHLORIDE 10 MG/ML
INJECTION, SOLUTION EPIDURAL; INFILTRATION; INTRACAUDAL; PERINEURAL PRN
Status: DISCONTINUED | OUTPATIENT
Start: 2024-04-08 | End: 2024-04-08 | Stop reason: SDUPTHER

## 2024-04-08 RX ORDER — ONDANSETRON 2 MG/ML
INJECTION INTRAMUSCULAR; INTRAVENOUS PRN
Status: DISCONTINUED | OUTPATIENT
Start: 2024-04-08 | End: 2024-04-08 | Stop reason: SDUPTHER

## 2024-04-08 RX ORDER — FENTANYL CITRATE 50 UG/ML
INJECTION, SOLUTION INTRAMUSCULAR; INTRAVENOUS PRN
Status: DISCONTINUED | OUTPATIENT
Start: 2024-04-08 | End: 2024-04-08 | Stop reason: SDUPTHER

## 2024-04-08 RX ADMIN — FENTANYL CITRATE 50 MCG: 50 INJECTION, SOLUTION INTRAMUSCULAR; INTRAVENOUS at 11:54

## 2024-04-08 RX ADMIN — ONDANSETRON 4 MG: 2 INJECTION INTRAMUSCULAR; INTRAVENOUS at 11:26

## 2024-04-08 RX ADMIN — FENTANYL CITRATE 50 MCG: 50 INJECTION, SOLUTION INTRAMUSCULAR; INTRAVENOUS at 12:05

## 2024-04-08 RX ADMIN — SODIUM CHLORIDE, POTASSIUM CHLORIDE, SODIUM LACTATE AND CALCIUM CHLORIDE: 600; 310; 30; 20 INJECTION, SOLUTION INTRAVENOUS at 09:42

## 2024-04-08 RX ADMIN — LIDOCAINE HYDROCHLORIDE 1 ML: 10 INJECTION, SOLUTION EPIDURAL; INFILTRATION; INTRACAUDAL; PERINEURAL at 09:41

## 2024-04-08 RX ADMIN — GABAPENTIN 300 MG: 300 CAPSULE ORAL at 09:25

## 2024-04-08 RX ADMIN — LIDOCAINE HYDROCHLORIDE 50 MG: 10 INJECTION, SOLUTION EPIDURAL; INFILTRATION; INTRACAUDAL; PERINEURAL at 11:20

## 2024-04-08 RX ADMIN — KETOROLAC TROMETHAMINE 30 MG: 30 INJECTION, SOLUTION INTRAMUSCULAR at 12:06

## 2024-04-08 RX ADMIN — FENTANYL CITRATE 50 MCG: 50 INJECTION, SOLUTION INTRAMUSCULAR; INTRAVENOUS at 11:41

## 2024-04-08 RX ADMIN — MIDAZOLAM 2 MG: 1 INJECTION INTRAMUSCULAR; INTRAVENOUS at 11:17

## 2024-04-08 RX ADMIN — FENTANYL CITRATE 50 MCG: 50 INJECTION, SOLUTION INTRAMUSCULAR; INTRAVENOUS at 11:20

## 2024-04-08 RX ADMIN — ACETAMINOPHEN 1000 MG: 500 TABLET ORAL at 09:25

## 2024-04-08 RX ADMIN — PROPOFOL 200 MG: 10 INJECTION, EMULSION INTRAVENOUS at 11:20

## 2024-04-08 ASSESSMENT — PAIN - FUNCTIONAL ASSESSMENT
PAIN_FUNCTIONAL_ASSESSMENT: NONE - DENIES PAIN
PAIN_FUNCTIONAL_ASSESSMENT: 0-10

## 2024-04-08 ASSESSMENT — PAIN SCALES - GENERAL: PAINLEVEL_OUTOF10: 4

## 2024-04-08 ASSESSMENT — PAIN DESCRIPTION - ORIENTATION: ORIENTATION: LEFT

## 2024-04-08 ASSESSMENT — PAIN DESCRIPTION - LOCATION: LOCATION: KNEE

## 2024-04-08 ASSESSMENT — PAIN DESCRIPTION - DESCRIPTORS: DESCRIPTORS: ACHING

## 2024-04-08 NOTE — DISCHARGE INSTRUCTIONS
[email or web address] and enter I338 to learn more about \"Knee Arthroscopy: What to Expect at Home.\"  Current as of: July 17, 2023               Content Version: 14.0  © 5791-5450 [employer].   Care instructions adapted under license by [employer]. If you have questions about a medical condition or this instruction, always ask your healthcare professional. [employer] disclaims any warranty or liability for your use of this information.

## 2024-04-08 NOTE — OP NOTE
Operative Note      Patient: Mikhail Mahan  YOB: 1975  MRN: [id number]    Date of Procedure: 4/8/2024    Pre-Op Diagnosis Codes:     * Dislocation of left knee with medial meniscus tear, initial encounter [S83.105A, S83.242A]    Post-Op Diagnosis:  Tear of the medial meniscus concerning the root ligament of the left knee       Procedure(s):  KNEE ARTHROSCOPY PARTIAL MEDIAL MENISCECTOMY LEFT    Surgeon(s):  Holger Cabral MD    Assistant:   Resident: Lizzy Joy DO    Anesthesia: General    Estimated Blood Loss (mL): Minimal    Complications: None    Specimens:   * No specimens in log *    Implants:  * No implants in log *      Drains: * No LDAs found *    Findings:  Infection Present At Time Of Surgery (PATOS) (choose all levels that have infection present):  No infection present  Other Findings: Small tear of the posterior horn of the medial meniscus that extends near the root ligament    Detailed Description of Procedure:       Patient is a 48 y.o. year old male who presents with a history of pain, locking, and giving away sensations of their left knee. Physical examination was positive for Avi's examination. MRI was consistent with a tear of the medial meniscus near the root ligament. Having failed conservative treatment, it was advised that arthroscopic examination and treatment of their knee would be beneficial and consent was obtained with risk and benefits explained to the patient. The patient was taken tot he operative room and general anesthesia was administered. A tourniquet was placed to the operatives lower extremity and then placed in the leg schuler. The leg was exsanguinated and the tourniquet inflated to the 300mmHg. The leg was the prepped and draped in the usual sterile fashion. Time-out was called to verify laterality.     Medial and lateral portals were made and the scope placed in the lateral portal. The patella femoral joint was examined and noted mild grade 1

## 2024-04-08 NOTE — ANESTHESIA PRE PROCEDURE
\"PO2ART\", \"RKL7ODN\", \"WDG1HDZ\", \"BEART\", \"N7JHHJNT\"     Type & Screen (If Applicable):  No results found for: \"LABABO\", \"LABRH\"    Drug/Infectious Status (If Applicable):  No results found for: \"HIV\", \"HEPCAB\"    COVID-19 Screening (If Applicable): No results found for: \"COVID19\"        Anesthesia Evaluation  Patient summary reviewed and Nursing notes reviewed  Airway: Mallampati: III  TM distance: >3 FB   Neck ROM: full  Mouth opening: > = 3 FB   Dental: normal exam         Pulmonary:Negative Pulmonary ROS and normal exam  breath sounds clear to auscultation                             Cardiovascular:  Exercise tolerance: good (>4 METS)          Rhythm: regular  Rate: normal                    Neuro/Psych:   Negative Neuro/Psych ROS              GI/Hepatic/Renal: Neg GI/Hepatic/Renal ROS            Endo/Other: Negative Endo/Other ROS                    Abdominal:             Vascular: negative vascular ROS.         Other Findings:       Anesthesia Plan      general     ASA 2       Induction: intravenous.    MIPS: Postoperative opioids intended and Prophylactic antiemetics administered.  Anesthetic plan and risks discussed with patient.      Plan discussed with CRNA.                Sebas Esposito MD   4/8/2024             show well-nourished/well-developed/no distress/well-groomed

## 2024-04-08 NOTE — INTERVAL H&P NOTE
Update History & Physical    The patient's History and Physical of   March 29, 2024    Patient will be having : Procedure(s):  KNEE ARTHROSCOPY PARTIAL MEDIAL MENISCECTOMY LEFT  The surgical site was confirmed by the  patient and me.     There was no change. Or updates at this time.     Patient did not require any medical  clearance.     Patient has been NPO since midnight. No blood thinners .    Patient denies any personal or family problems with anesthesia.    Patient was physically assessed, including cardiovascular and respiratory.     Patient understands and wants to proceed with the procedure.     Electronically signed by AYAKA BURTON CNP on 4/8/2024 at 9:14 AM    Note marked for cosign by provider

## 2024-04-08 NOTE — PROGRESS NOTES
CLINICAL PHARMACY NOTE: MEDS TO BEDS    Total # of Prescriptions Filled: 1   The following medications were delivered to the patient:  Hydrocodone/APAP 5/325mg    Additional Documentation: delivered to family waiting room [place] 4/8/24 12:49PM MICAH

## 2024-04-08 NOTE — ANESTHESIA POSTPROCEDURE EVALUATION
Department of Anesthesiology  Postprocedure Note    Patient: Mikhail Mahan  MRN: [id number]  YOB: 1975  Date of evaluation: 4/8/2024    Procedure Summary       Date: 04/08/24 Room / Location: [address]    Anesthesia Start: 1115 Anesthesia Stop: 1222    Procedure: KNEE ARTHROSCOPY PARTIAL MEDIAL MENISCECTOMY LEFT (Left: Knee) Diagnosis:       Dislocation of left knee with medial meniscus tear, initial encounter      (Dislocation of left knee with medial meniscus tear, initial encounter [S83.105A, S83.242A])    Surgeons: Holger Cabral MD Responsible Provider: Sebas Esposito MD    Anesthesia Type: general ASA Status: 2            Anesthesia Type: No value filed.    Lake Phase I: Lake Score: 10    Lake Phase II: Lake Score: 10    Anesthesia Post Evaluation    Comments: POST- ANESTHESIA EVALUATION       Pt Name: Mikhail Mahan  MRN: [id number]  YOB: 1975  Date of evaluation: 4/8/2024  Time:  1:56 PM      BP (!) 143/92   Pulse 67   Temp 96.8 °F (36 °C) (Infrared)   Resp 14   Ht 1.854 m (6' 1\")   Wt (!) 140.6 kg (310 lb)   SpO2 97%   BMI 40.90 kg/m²      Consciousness Level  Awake  Cardiopulmonary Status  Stable  Pain Adequately Treated YES  Nausea / Vomiting  NO  Adequate Hydration  YES  Anesthesia Related Complications NONE      Electronically signed by Sebas Esposito MD on 4/8/2024 at 1:56 PM           No notable events documented.

## 2024-04-19 ENCOUNTER — OFFICE VISIT (OUTPATIENT)
Dept: ORTHOPEDIC SURGERY | Age: 49
End: 2024-04-19

## 2024-04-19 VITALS — BODY MASS INDEX: 41.08 KG/M2 | RESPIRATION RATE: 14 BRPM | WEIGHT: 310 LBS | HEIGHT: 73 IN

## 2024-04-19 DIAGNOSIS — Z98.890 S/P LEFT KNEE ARTHROSCOPY: Primary | ICD-10-CM

## 2024-04-19 PROCEDURE — 99024 POSTOP FOLLOW-UP VISIT: CPT | Performed by: PHYSICIAN ASSISTANT

## 2024-04-19 NOTE — PROGRESS NOTES
Patient returns today status post left knee arthroscopy with partial (medial) meniscectomy. Patient has no major complaints other than expected tightness/swelling with ROM. Sharp/stabbing pain has improved.     On exam, portal sites are without redness or drainage. No calf tenderness; negative Laci's sign. Motion is 0-100 degrees. No significant effusion.     Assessment  Status post left knee arthroscopy    Plan  Patient given exercises to perform. Patient given activities/ motions to complete. Continue activities at home. Return to work. RTO PRN. Call with any future problems.

## 2024-10-20 ENCOUNTER — HOSPITAL ENCOUNTER (EMERGENCY)
Age: 49
Discharge: HOME | End: 2024-10-20
Attending: EMERGENCY MEDICINE
Payer: COMMERCIAL

## 2024-10-20 VITALS
HEIGHT: 73 IN | SYSTOLIC BLOOD PRESSURE: 160 MMHG | HEART RATE: 97 BPM | WEIGHT: 300 LBS | TEMPERATURE: 98.3 F | DIASTOLIC BLOOD PRESSURE: 99 MMHG | BODY MASS INDEX: 39.76 KG/M2 | OXYGEN SATURATION: 98 % | RESPIRATION RATE: 16 BRPM

## 2024-10-20 DIAGNOSIS — L03.115 CELLULITIS OF RIGHT LEG: Primary | ICD-10-CM

## 2024-10-20 LAB
ANION GAP SERPL CALCULATED.3IONS-SCNC: 11 MMOL/L (ref 9–17)
BASOPHILS # BLD: 0.1 K/UL (ref 0–0.2)
BASOPHILS NFR BLD: 1 % (ref 0–2)
BUN SERPL-MCNC: 9 MG/DL (ref 6–20)
CALCIUM SERPL-MCNC: 9 MG/DL (ref 8.6–10.4)
CHLORIDE SERPL-SCNC: 97 MMOL/L (ref 98–107)
CO2 SERPL-SCNC: 23 MMOL/L (ref 20–31)
CREAT SERPL-MCNC: 1 MG/DL (ref 0.7–1.2)
CRP SERPL HS-MCNC: 211.9 MG/L (ref 0–5)
EOSINOPHIL # BLD: 0 K/UL (ref 0–0.4)
EOSINOPHILS RELATIVE PERCENT: 0 % (ref 1–4)
ERYTHROCYTE [DISTWIDTH] IN BLOOD BY AUTOMATED COUNT: 12.7 % (ref 12.5–15.4)
ERYTHROCYTE [SEDIMENTATION RATE] IN BLOOD BY PHOTOMETRIC METHOD: 35 MM/HR (ref 0–20)
GFR, ESTIMATED: >90 ML/MIN/1.73M2
GLUCOSE SERPL-MCNC: 118 MG/DL (ref 70–99)
HCT VFR BLD AUTO: 40.9 % (ref 41–53)
HGB BLD-MCNC: 14.5 G/DL (ref 13.5–17.5)
LYMPHOCYTES NFR BLD: 0.61 K/UL (ref 1–4.8)
LYMPHOCYTES RELATIVE PERCENT: 6 % (ref 24–44)
MCH RBC QN AUTO: 33.4 PG (ref 26–34)
MCHC RBC AUTO-ENTMCNC: 35.3 G/DL (ref 31–37)
MCV RBC AUTO: 94.7 FL (ref 80–100)
MONOCYTES NFR BLD: 0.51 K/UL (ref 0.1–0.8)
MONOCYTES NFR BLD: 5 % (ref 1–7)
MORPHOLOGY: ABNORMAL
NEUTROPHILS NFR BLD: 88 % (ref 36–66)
NEUTS SEG NFR BLD: 8.98 K/UL (ref 1.8–7.7)
PLATELET # BLD AUTO: 194 K/UL (ref 140–450)
PMV BLD AUTO: 7.6 FL (ref 6–12)
POTASSIUM SERPL-SCNC: 3.4 MMOL/L (ref 3.7–5.3)
RBC # BLD AUTO: 4.33 M/UL (ref 4.5–5.9)
SODIUM SERPL-SCNC: 131 MMOL/L (ref 135–144)
WBC OTHER # BLD: 10.2 K/UL (ref 3.5–11)

## 2024-10-20 PROCEDURE — 96375 TX/PRO/DX INJ NEW DRUG ADDON: CPT

## 2024-10-20 PROCEDURE — 99284 EMERGENCY DEPT VISIT MOD MDM: CPT

## 2024-10-20 PROCEDURE — 85025 COMPLETE CBC W/AUTO DIFF WBC: CPT

## 2024-10-20 PROCEDURE — 80048 BASIC METABOLIC PNL TOTAL CA: CPT

## 2024-10-20 PROCEDURE — 2580000003 HC RX 258

## 2024-10-20 PROCEDURE — 96365 THER/PROPH/DIAG IV INF INIT: CPT

## 2024-10-20 PROCEDURE — 6360000002 HC RX W HCPCS

## 2024-10-20 PROCEDURE — 86140 C-REACTIVE PROTEIN: CPT

## 2024-10-20 PROCEDURE — 85652 RBC SED RATE AUTOMATED: CPT

## 2024-10-20 RX ORDER — KETOROLAC TROMETHAMINE 15 MG/ML
15 INJECTION, SOLUTION INTRAMUSCULAR; INTRAVENOUS ONCE
Status: COMPLETED | OUTPATIENT
Start: 2024-10-20 | End: 2024-10-20

## 2024-10-20 RX ORDER — DOXYCYCLINE HYCLATE 100 MG
100 TABLET ORAL 2 TIMES DAILY
Qty: 14 TABLET | Refills: 0 | Status: SHIPPED | OUTPATIENT
Start: 2024-10-20 | End: 2024-10-27

## 2024-10-20 RX ADMIN — CEFAZOLIN 2000 MG: 1 INJECTION, POWDER, FOR SOLUTION INTRAMUSCULAR; INTRAVENOUS at 18:57

## 2024-10-20 RX ADMIN — KETOROLAC TROMETHAMINE 15 MG: 15 INJECTION, SOLUTION INTRAMUSCULAR; INTRAVENOUS at 19:50

## 2024-10-20 ASSESSMENT — PAIN DESCRIPTION - ORIENTATION: ORIENTATION: RIGHT

## 2024-10-20 ASSESSMENT — PAIN - FUNCTIONAL ASSESSMENT: PAIN_FUNCTIONAL_ASSESSMENT: 0-10

## 2024-10-20 ASSESSMENT — PAIN SCALES - GENERAL
PAINLEVEL_OUTOF10: 6
PAINLEVEL_OUTOF10: 5

## 2024-10-20 ASSESSMENT — PAIN DESCRIPTION - LOCATION: LOCATION: LEG

## 2024-10-20 NOTE — ED PROVIDER NOTES
Temp: 99.4 °F (37.4 °C), Pulse: (!) 101, Respirations: 16      PERTINENT ATTENDING PHYSICIAN COMMENTS:    Will check baseline blood work on this patient.  To me looks like cellulitis.  Obese skin marker to outline the area of infection.  I have defined expectations.  Will do first dose of antibiotics here and outpatient management.  If he is worsens precipitously, he will will need to be reevaluated in the emergency department for potential IV antibiotics and admission.      (Please note that portions of this note were completed with a voice recognition program.  Efforts were made to edit the dictations but occasionally words are mis-transcribed.)    Bronson Escalante MD,, MD, F.A.C.E.P.  Attending Emergency Medicine Physician        Bronson Escalante MD  10/20/24 1915    
disposition diagnosis with the patient and or their family/guardian. I have answered their questions and given discharge instructions. They voiced understanding of these instructions and did not have any further questions or complaints.     This patient was seen by the attending physician and they agreed with the assessment and plan.       FINAL IMPRESSION      1. Cellulitis of right leg          DISPOSITION / PLAN     Disposition Decision To Discharge    Patient Refferred to:  Zenia Rosario APRN - CNP  [address]  [place] [id number] [phone]    Call in 1 day  to arrange for close follow up      Discharge Medications:  Discharge Medication List as of 10/20/2024  7:47 PM        START taking these medications    Details   doxycycline hyclate (VIBRA-TABS) 100 MG tablet Take 1 tablet by mouth 2 times daily for 7 days, Disp-14 tablet, R-0Normal             AYAKA Ortiz CNP   Emergency Medicine Nurse Practitioner    (Please note that portions of this note were completed with a voice recognitionprogram.  Efforts were made to edit the dictations but occasionally words are mis-transcribed.)       Mo Mejias APRN - CNP  10/20/24 2121

## 2024-10-23 ENCOUNTER — PATIENT MESSAGE (OUTPATIENT)
Dept: FAMILY MEDICINE CLINIC | Age: 49
End: 2024-10-23

## 2024-10-23 ENCOUNTER — OFFICE VISIT (OUTPATIENT)
Dept: FAMILY MEDICINE CLINIC | Age: 49
End: 2024-10-23
Payer: COMMERCIAL

## 2024-10-23 VITALS
BODY MASS INDEX: 41.03 KG/M2 | OXYGEN SATURATION: 98 % | RESPIRATION RATE: 16 BRPM | SYSTOLIC BLOOD PRESSURE: 138 MMHG | WEIGHT: 311 LBS | TEMPERATURE: 97.3 F | HEART RATE: 86 BPM | DIASTOLIC BLOOD PRESSURE: 86 MMHG

## 2024-10-23 DIAGNOSIS — L03.115 CELLULITIS OF RIGHT LOWER EXTREMITY: Primary | ICD-10-CM

## 2024-10-23 PROCEDURE — 99213 OFFICE O/P EST LOW 20 MIN: CPT | Performed by: NURSE PRACTITIONER

## 2024-10-23 RX ORDER — CEPHALEXIN 500 MG/1
500 CAPSULE ORAL 3 TIMES DAILY
Qty: 30 CAPSULE | Refills: 0 | Status: SHIPPED | OUTPATIENT
Start: 2024-10-23 | End: 2024-11-02

## 2024-10-23 SDOH — ECONOMIC STABILITY: INCOME INSECURITY: HOW HARD IS IT FOR YOU TO PAY FOR THE VERY BASICS LIKE FOOD, HOUSING, MEDICAL CARE, AND HEATING?: NOT HARD AT ALL

## 2024-10-23 SDOH — ECONOMIC STABILITY: FOOD INSECURITY: WITHIN THE PAST 12 MONTHS, YOU WORRIED THAT YOUR FOOD WOULD RUN OUT BEFORE YOU GOT MONEY TO BUY MORE.: NEVER TRUE

## 2024-10-23 SDOH — ECONOMIC STABILITY: FOOD INSECURITY: WITHIN THE PAST 12 MONTHS, THE FOOD YOU BOUGHT JUST DIDN'T LAST AND YOU DIDN'T HAVE MONEY TO GET MORE.: NEVER TRUE

## 2024-10-23 ASSESSMENT — PATIENT HEALTH QUESTIONNAIRE - PHQ9
1. LITTLE INTEREST OR PLEASURE IN DOING THINGS: NOT AT ALL
SUM OF ALL RESPONSES TO PHQ QUESTIONS 1-9: 0
2. FEELING DOWN, DEPRESSED OR HOPELESS: NOT AT ALL
SUM OF ALL RESPONSES TO PHQ QUESTIONS 1-9: 0
SUM OF ALL RESPONSES TO PHQ QUESTIONS 1-9: 0
SUM OF ALL RESPONSES TO PHQ9 QUESTIONS 1 & 2: 0
SUM OF ALL RESPONSES TO PHQ QUESTIONS 1-9: 0

## 2024-10-23 ASSESSMENT — ENCOUNTER SYMPTOMS
BACK PAIN: 0
ABDOMINAL PAIN: 0
SHORTNESS OF BREATH: 0
DIARRHEA: 0
RHINORRHEA: 0
COLOR CHANGE: 0
COUGH: 0
CONSTIPATION: 0
SORE THROAT: 0
NAUSEA: 0
ABDOMINAL DISTENTION: 0
CHEST TIGHTNESS: 0

## 2024-10-23 NOTE — PROGRESS NOTES
Zenia Rosario, APRN-CNP  [facility]  [facility] MEDICINE  [address], [address]  [facility] [address]  Dept: [phone]  Dept Fax: [phone]     Patient ID: Mikhail Mahan is a 49 y.o. male Established patient    HPI    Pt here today for an acute visit secondary to cellulitis of right leg, went to ER on 10/20 and got treatment with doxycycline but symptoms seem to be worsening instead of getting better.   He was sitting by [employer] and was itching eczema spots and didn't think anything of it until the next day he started to feel sick with fever chills body aches, took some ibuprofen and hydrated, on Sunday started to get red shadow and progressed to a streak that was going up thigh, he  tired to work today but started feeling lousy and went home and called the office.     Pt denies any fever or chills.  Pt today denies any HA, chest pain, or SOB.  Pt denies any N/V/D/C or abdominal pain.    Otherwise pt doing well on current tx and no other concerns today.     The patient's past medical, surgical, social, and family history as well as his current medications and allergies were reviewed as documented in today's encounter by HUSAM Hagan.      Previous office notes, labs, imaging and hospital records were reviewed prior to and during encounter.    Current Outpatient Medications on File Prior to Visit   Medication Sig Dispense Refill    doxycycline hyclate (VIBRA-TABS) 100 MG tablet Take 1 tablet by mouth 2 times daily for 7 days 14 tablet 0    fexofenadine (ALLEGRA) 180 MG tablet Take 1 tablet by mouth 2 times daily      Fluticasone Propionate (FLONASE ALLERGY RELIEF NA) by Nasal route       No current facility-administered medications on file prior to visit.        Subjective:     Review of Systems   Constitutional:  Negative for activity change, fatigue and fever.   HENT:  Negative for congestion, ear pain, rhinorrhea and sore throat.    Respiratory:  Negative

## 2024-10-29 ENCOUNTER — OFFICE VISIT (OUTPATIENT)
Dept: FAMILY MEDICINE CLINIC | Age: 49
End: 2024-10-29
Payer: COMMERCIAL

## 2024-10-29 VITALS
HEART RATE: 81 BPM | BODY MASS INDEX: 41.03 KG/M2 | RESPIRATION RATE: 16 BRPM | OXYGEN SATURATION: 96 % | TEMPERATURE: 98.2 F | WEIGHT: 311 LBS | DIASTOLIC BLOOD PRESSURE: 76 MMHG | SYSTOLIC BLOOD PRESSURE: 128 MMHG

## 2024-10-29 DIAGNOSIS — L03.115 CELLULITIS OF RIGHT LOWER EXTREMITY: Primary | ICD-10-CM

## 2024-10-29 PROCEDURE — 99213 OFFICE O/P EST LOW 20 MIN: CPT | Performed by: NURSE PRACTITIONER

## 2024-10-29 ASSESSMENT — ENCOUNTER SYMPTOMS
SHORTNESS OF BREATH: 0
DIARRHEA: 0
NAUSEA: 0
COLOR CHANGE: 0
ABDOMINAL PAIN: 0
BACK PAIN: 0
ABDOMINAL DISTENTION: 0
RHINORRHEA: 0
CONSTIPATION: 0
COUGH: 0
CHEST TIGHTNESS: 0
SORE THROAT: 0

## 2024-10-29 NOTE — PROGRESS NOTES
Zenia Rosario, APRN-CNP  [facility] MEDICINE  [address], [address]  [facility] [address]  Dept: [phone]  Dept Fax: [phone]     Patient ID: Mikhail Mahan is a 49 y.o. male Established patient    HPI    Pt here today for follow up to cellulitis of right leg, went to ER on 10/20 and got treatment with doxycycline and then was also started keflex and has seen more improvement. He is doing the epsom salt soaks daily now. He notice no swelling in the morning but as the day goes on he will get increase swelling. It is not as hot or tender as it was before.   Pt denies any fever or chills.  Pt today denies any HA, chest pain, or SOB.  Pt denies any N/V/D/C or abdominal pain.    Otherwise pt doing well on current tx and no other concerns today.     The patient's past medical, surgical, social, and family history as well as his current medications and allergies were reviewed as documented in today's encounter by HUSAM Hagan.      Previous office notes, labs, imaging and hospital records were reviewed prior to and during encounter.    Current Outpatient Medications on File Prior to Visit   Medication Sig Dispense Refill    cephALEXin (KEFLEX) 500 MG capsule Take 1 capsule by mouth 3 times daily for 10 days 30 capsule 0    fexofenadine (ALLEGRA) 180 MG tablet Take 1 tablet by mouth 2 times daily      Fluticasone Propionate (FLONASE ALLERGY RELIEF NA) by Nasal route       No current facility-administered medications on file prior to visit.        Subjective:     Review of Systems   Constitutional:  Negative for activity change, fatigue and fever.   HENT:  Negative for congestion, ear pain, rhinorrhea and sore throat.    Respiratory:  Negative for cough, chest tightness and shortness of breath.    Cardiovascular:  Negative for chest pain and palpitations.   Gastrointestinal:  Negative for abdominal distention, abdominal pain, constipation, diarrhea and nausea.   Endocrine:

## (undated) DEVICE — [TOMCAT CUTTER, ARTHROSCOPIC SHAVER BLADE,  DO NOT RESTERILIZE,  DO NOT USE IF PACKAGE IS DAMAGED,  KEEP DRY,  KEEP AWAY FROM SUNLIGHT]: Brand: FORMULA

## (undated) DEVICE — SOLUTION IRRIG 3000ML LAC RINGERS ARTHROMTC PLAS CONT

## (undated) DEVICE — GLOVE ORTHO 8   MSG9480

## (undated) DEVICE — 60-7070-106 TRNQT,DPSB,PLC BROWN: Brand: MEDLINE RENEWAL

## (undated) DEVICE — ST CHARLES KNEE ARTHRO: Brand: MEDLINE INDUSTRIES, INC.

## (undated) DEVICE — ZIMMER® STERILE DISPOSABLE TOURNIQUET CUFF WITH PLC, DUAL PORT, SINGLE BLADDER, 30 IN. (76 CM)

## (undated) DEVICE — Z DISCONTINUED USE 2218423 SUTURE ETHILON SZ 3-0 L18IN NONABSORBABLE BLK FS-1 L24MM 3/8 663H

## (undated) DEVICE — DRESSING,GAUZE,XEROFORM,CURAD,1"X8",ST: Brand: CURAD

## (undated) DEVICE — SINGLE PORT MANIFOLD: Brand: NEPTUNE 2